# Patient Record
Sex: MALE | Race: WHITE | NOT HISPANIC OR LATINO | URBAN - METROPOLITAN AREA
[De-identification: names, ages, dates, MRNs, and addresses within clinical notes are randomized per-mention and may not be internally consistent; named-entity substitution may affect disease eponyms.]

---

## 2019-06-03 ENCOUNTER — OUTPATIENT (OUTPATIENT)
Dept: OUTPATIENT SERVICES | Facility: HOSPITAL | Age: 64
LOS: 1 days | Discharge: HOME | End: 2019-06-03
Payer: OTHER MISCELLANEOUS

## 2019-06-03 VITALS
HEART RATE: 84 BPM | DIASTOLIC BLOOD PRESSURE: 72 MMHG | TEMPERATURE: 98 F | HEIGHT: 71 IN | RESPIRATION RATE: 20 BRPM | SYSTOLIC BLOOD PRESSURE: 126 MMHG | WEIGHT: 257.94 LBS | OXYGEN SATURATION: 98 %

## 2019-06-03 DIAGNOSIS — Z29.9 ENCOUNTER FOR PROPHYLACTIC MEASURES, UNSPECIFIED: ICD-10-CM

## 2019-06-03 DIAGNOSIS — Z98.890 OTHER SPECIFIED POSTPROCEDURAL STATES: Chronic | ICD-10-CM

## 2019-06-03 DIAGNOSIS — Z90.49 ACQUIRED ABSENCE OF OTHER SPECIFIED PARTS OF DIGESTIVE TRACT: Chronic | ICD-10-CM

## 2019-06-03 DIAGNOSIS — Z96.642 PRESENCE OF LEFT ARTIFICIAL HIP JOINT: Chronic | ICD-10-CM

## 2019-06-03 DIAGNOSIS — Z96.641 PRESENCE OF RIGHT ARTIFICIAL HIP JOINT: Chronic | ICD-10-CM

## 2019-06-03 DIAGNOSIS — M54.5 LOW BACK PAIN: ICD-10-CM

## 2019-06-03 LAB
ANION GAP SERPL CALC-SCNC: 4 MMOL/L — LOW (ref 5–17)
APPEARANCE UR: CLEAR — SIGNIFICANT CHANGE UP
APTT BLD: 30.6 SEC — SIGNIFICANT CHANGE UP (ref 27.5–36.3)
BASOPHILS # BLD AUTO: 0.05 K/UL — SIGNIFICANT CHANGE UP (ref 0–0.2)
BASOPHILS NFR BLD AUTO: 0.5 % — SIGNIFICANT CHANGE UP (ref 0–2)
BILIRUB UR-MCNC: NEGATIVE — SIGNIFICANT CHANGE UP
BLD GP AB SCN SERPL QL: SIGNIFICANT CHANGE UP
BUN SERPL-MCNC: 18 MG/DL — SIGNIFICANT CHANGE UP (ref 7–23)
CALCIUM SERPL-MCNC: 9.4 MG/DL — SIGNIFICANT CHANGE UP (ref 8.5–10.1)
CHLORIDE SERPL-SCNC: 103 MMOL/L — SIGNIFICANT CHANGE UP (ref 96–108)
CO2 SERPL-SCNC: 28 MMOL/L — SIGNIFICANT CHANGE UP (ref 22–31)
COLOR SPEC: YELLOW — SIGNIFICANT CHANGE UP
CREAT SERPL-MCNC: 0.93 MG/DL — SIGNIFICANT CHANGE UP (ref 0.5–1.3)
DIFF PNL FLD: NEGATIVE — SIGNIFICANT CHANGE UP
EOSINOPHIL # BLD AUTO: 0.25 K/UL — SIGNIFICANT CHANGE UP (ref 0–0.5)
EOSINOPHIL NFR BLD AUTO: 2.6 % — SIGNIFICANT CHANGE UP (ref 0–6)
GLUCOSE SERPL-MCNC: 97 MG/DL — SIGNIFICANT CHANGE UP (ref 70–99)
GLUCOSE UR QL: 1000 MG/DL
HBA1C BLD-MCNC: 6.2 % — HIGH (ref 4–5.6)
HCT VFR BLD CALC: 44.1 % — SIGNIFICANT CHANGE UP (ref 39–50)
HGB BLD-MCNC: 14.1 G/DL — SIGNIFICANT CHANGE UP (ref 13–17)
IMM GRANULOCYTES NFR BLD AUTO: 0.5 % — SIGNIFICANT CHANGE UP (ref 0–1.5)
INR BLD: 1.04 RATIO — SIGNIFICANT CHANGE UP (ref 0.88–1.16)
KETONES UR-MCNC: ABNORMAL
LEUKOCYTE ESTERASE UR-ACNC: NEGATIVE — SIGNIFICANT CHANGE UP
LYMPHOCYTES # BLD AUTO: 2.36 K/UL — SIGNIFICANT CHANGE UP (ref 1–3.3)
LYMPHOCYTES # BLD AUTO: 24.9 % — SIGNIFICANT CHANGE UP (ref 13–44)
MCHC RBC-ENTMCNC: 28 PG — SIGNIFICANT CHANGE UP (ref 27–34)
MCHC RBC-ENTMCNC: 32 GM/DL — SIGNIFICANT CHANGE UP (ref 32–36)
MCV RBC AUTO: 87.5 FL — SIGNIFICANT CHANGE UP (ref 80–100)
MONOCYTES # BLD AUTO: 0.77 K/UL — SIGNIFICANT CHANGE UP (ref 0–0.9)
MONOCYTES NFR BLD AUTO: 8.1 % — SIGNIFICANT CHANGE UP (ref 2–14)
MRSA PCR RESULT.: SIGNIFICANT CHANGE UP
NEUTROPHILS # BLD AUTO: 6 K/UL — SIGNIFICANT CHANGE UP (ref 1.8–7.4)
NEUTROPHILS NFR BLD AUTO: 63.4 % — SIGNIFICANT CHANGE UP (ref 43–77)
NITRITE UR-MCNC: NEGATIVE — SIGNIFICANT CHANGE UP
PH UR: 5 — SIGNIFICANT CHANGE UP (ref 5–8)
PLATELET # BLD AUTO: 247 K/UL — SIGNIFICANT CHANGE UP (ref 150–400)
POTASSIUM SERPL-MCNC: 4.4 MMOL/L — SIGNIFICANT CHANGE UP (ref 3.5–5.3)
POTASSIUM SERPL-SCNC: 4.4 MMOL/L — SIGNIFICANT CHANGE UP (ref 3.5–5.3)
PROT UR-MCNC: NEGATIVE MG/DL — SIGNIFICANT CHANGE UP
PROTHROM AB SERPL-ACNC: 11.6 SEC — SIGNIFICANT CHANGE UP (ref 10–12.9)
RBC # BLD: 5.04 M/UL — SIGNIFICANT CHANGE UP (ref 4.2–5.8)
RBC # FLD: 14.3 % — SIGNIFICANT CHANGE UP (ref 10.3–14.5)
S AUREUS DNA NOSE QL NAA+PROBE: SIGNIFICANT CHANGE UP
SODIUM SERPL-SCNC: 135 MMOL/L — SIGNIFICANT CHANGE UP (ref 135–145)
SP GR SPEC: 1.02 — SIGNIFICANT CHANGE UP (ref 1.01–1.02)
TYPE + AB SCN PNL BLD: SIGNIFICANT CHANGE UP
UROBILINOGEN FLD QL: NEGATIVE MG/DL — SIGNIFICANT CHANGE UP
WBC # BLD: 9.48 K/UL — SIGNIFICANT CHANGE UP (ref 3.8–10.5)
WBC # FLD AUTO: 9.48 K/UL — SIGNIFICANT CHANGE UP (ref 3.8–10.5)

## 2019-06-03 PROCEDURE — 71046 X-RAY EXAM CHEST 2 VIEWS: CPT | Mod: 26

## 2019-06-03 PROCEDURE — 93010 ELECTROCARDIOGRAM REPORT: CPT

## 2019-06-03 RX ORDER — METOPROLOL TARTRATE 50 MG
1 TABLET ORAL
Qty: 0 | Refills: 0 | DISCHARGE

## 2019-06-03 NOTE — H&P PST ADULT - ATTENDING COMMENTS
patient with extensive conservative treatment with severe back pain and lumbar instability at L3-4 and with marked degenerative disc disease at L4-5 and L5-S1.  Therefore decision to proceed with lumbar decompression and fusion at L3-L4 level and fusion at L4-5 and L5-S1.  All questions answered.

## 2019-06-03 NOTE — H&P PST ADULT - ASSESSMENT
63 y/o with PMH of atrial flutter, back pain, DM, BPH. Complain of low back pain that radiates to both legs with numbness. Scheduled for L3-L4 lumbar decompression and fusion.  Plan  1. Stop all NSAIDS, herbal supplements and vitamins for 7 days.  2. NPO at midnight.  3. Use EZ sponges as directed  4. Use mupirocin as directed  5. Labs, EKG, CXR as per surgeon  6. PMD/cardiologist visit for optimization prior to surgery as per surgeon.  CAPRINI SCORE [CLOT]    AGE RELATED RISK FACTORS                                                       MOBILITY RELATED FACTORS  [ ] Age 41-60 years                                            (1 Point)                  [ ] Bed rest                                                        (1 Point)  [ x ] Age: 61-74 years                                           (2 Points)                 [ ] Plaster cast                                                   (2 Points)  [ ] Age= 75 years                                              (3 Points)                 [ ] Bed bound for more than 72 hours                 (2 Points)    DISEASE RELATED RISK FACTORS                                               GENDER SPECIFIC FACTORS  [ ] Edema in the lower extremities                       (1 Point)                  [ ] Pregnancy                                                     (1 Point)  [ ] Varicose veins                                               (1 Point)                  [ ] Post-partum < 6 weeks                                   (1 Point)             [x ] BMI > 25 Kg/m2                                            (1 Point)                  [ ] Hormonal therapy  or oral contraception          (1 Point)                 [ ] Sepsis (in the previous month)                        (1 Point)                  [ ] History of pregnancy complications                 (1 point)  [ ] Pneumonia or serious lung disease                                               [ ] Unexplained or recurrent                     (1 Point)           (in the previous month)                               (1 Point)  [ ] Abnormal pulmonary function test                     (1 Point)                 SURGERY RELATED RISK FACTORS  [ ] Acute myocardial infarction                              (1 Point)                 [ ]  Section                                             (1 Point)  [ ] Congestive heart failure (in the previous month)  (1 Point)               [ ] Minor surgery                                                  (1 Point)   [ ] Inflammatory bowel disease                             (1 Point)                 [ ] Arthroscopic surgery                                        (2 Points)  [ ] Central venous access                                      (2 Points)                [ x ] General surgery lasting more than 45 minutes   (2 Points)       [ ] Stroke (in the previous month)                          (5 Points)               [ ] Elective arthroplasty                                         (5 Points)     ()  malignancy                                                             (2 points )                                                                                                                                      HEMATOLOGY RELATED FACTORS                                                 TRAUMA RELATED RISK FACTORS  [ ] Prior episodes of VTE                                     (3 Points)                 [ ] Fracture of the hip, pelvis, or leg                       (5 Points)  [ ] Positive family history for VTE                         (3 Points)                 [ ] Acute spinal cord injury (in the previous month)  (5 Points)  [ ] Prothrombin 16740 A                                     (3 Points)                 [ ] Paralysis  (less than 1 month)                             (5 Points)  [ ] Factor V Leiden                                             (3 Points)                  [ ] Multiple Trauma within 1 month                        (5 Points)  [ ] Lupus anticoagulants                                     (3 Points)                                                           [ ] Anticardiolipin antibodies                               (3 Points)                                                       [ ] High homocysteine in the blood                      (3 Points)                                             [ ] Other congenital or acquired thrombophilia      (3 Points)                                                [ ] Heparin induced thrombocytopenia                  (3 Points)    (  ) Malignancy                                        Total Score [    5      ] 63 y/o with PMH of lumbar discs disorder, atrial flutter, back pain, DM, BPH. Complain of low back pain that radiates to both legs with numbness. Scheduled for L3-L4 lumbar decompression and fusion.    Plan  1. Stop all NSAIDS, herbal supplements and vitamins for 7 days.  2. NPO at midnight.  3. Use EZ sponges as directed  4. Use mupirocin as directed  5. Labs, EKG, CXR as per surgeon  6. PMD/cardiologist visit for optimization prior to surgery as per surgeon.    CAPRINI SCORE [CLOT]  AGE RELATED RISK FACTORS                                                       MOBILITY RELATED FACTORS  [ ] Age 41-60 years                                            (1 Point)                  [ ] Bed rest                                                        (1 Point)  [ x ] Age: 61-74 years                                           (2 Points)                 [ ] Plaster cast                                                   (2 Points)  [ ] Age= 75 years                                              (3 Points)                 [ ] Bed bound for more than 72 hours                 (2 Points)    DISEASE RELATED RISK FACTORS                                               GENDER SPECIFIC FACTORS  [ ] Edema in the lower extremities                       (1 Point)                  [ ] Pregnancy                                                     (1 Point)  [ ] Varicose veins                                               (1 Point)                  [ ] Post-partum < 6 weeks                                   (1 Point)             [x ] BMI > 25 Kg/m2                                            (1 Point)                  [ ] Hormonal therapy  or oral contraception          (1 Point)                 [ ] Sepsis (in the previous month)                        (1 Point)                  [ ] History of pregnancy complications                 (1 point)  [ ] Pneumonia or serious lung disease                                               [ ] Unexplained or recurrent                     (1 Point)           (in the previous month)                               (1 Point)  [ ] Abnormal pulmonary function test                     (1 Point)                 SURGERY RELATED RISK FACTORS  [ ] Acute myocardial infarction                              (1 Point)                 [ ]  Section                                             (1 Point)  [ ] Congestive heart failure (in the previous month)  (1 Point)               [ ] Minor surgery                                                  (1 Point)   [ ] Inflammatory bowel disease                             (1 Point)                 [ ] Arthroscopic surgery                                        (2 Points)  [ ] Central venous access                                      (2 Points)                [ x ] General surgery lasting more than 45 minutes   (2 Points)       [ ] Stroke (in the previous month)                          (5 Points)               [ ] Elective arthroplasty                                         (5 Points)     ()  malignancy                                                             (2 points )                                                                                                                                      HEMATOLOGY RELATED FACTORS                                                 TRAUMA RELATED RISK FACTORS  [ ] Prior episodes of VTE                                     (3 Points)                 [ ] Fracture of the hip, pelvis, or leg                       (5 Points)  [ ] Positive family history for VTE                         (3 Points)                 [ ] Acute spinal cord injury (in the previous month)  (5 Points)  [ ] Prothrombin 99182 A                                     (3 Points)                 [ ] Paralysis  (less than 1 month)                             (5 Points)  [ ] Factor V Leiden                                             (3 Points)                  [ ] Multiple Trauma within 1 month                        (5 Points)  [ ] Lupus anticoagulants                                     (3 Points)                                                           [ ] Anticardiolipin antibodies                               (3 Points)                                                       [ ] High homocysteine in the blood                      (3 Points)                                             [ ] Other congenital or acquired thrombophilia      (3 Points)                                                [ ] Heparin induced thrombocytopenia                  (3 Points)    (  ) Malignancy                                        Total Score [    5      ]

## 2019-06-03 NOTE — H&P PST ADULT - HISTORY OF PRESENT ILLNESS
65 y/o male with Scheduled for L3-L4 lumbar decompression and fusion. 65 y/o with PMH of atrial flutter, back pain, DM, BPH. Complain of low back pain that radiates to both legs with numbness. Pt takes oxycodone, gabapentin with mild pain relief. Scheduled for L3-L4 lumbar decompression and fusion. 65 y/o with PMH of lumbar discs disorder, atrial flutter, back pain, DM, BPH. Complain of low back pain that radiates to both legs with numbness. Pt takes oxycodone, gabapentin with mild pain relief. Scheduled for L3-L4 lumbar decompression and fusion. 65 y/o with PMH of lumbar discs disorder, atrial flutter, back pain, DM, BPH. Complain of low back pain that radiates to both legs more to the right leg, with numbness. Pt takes oxycodone, gabapentin with mild pain relief. Scheduled for L3-L4 lumbar decompression and fusion.

## 2019-06-03 NOTE — H&P PST ADULT - NSICDXPASTMEDICALHX_GEN_ALL_CORE_FT
PAST MEDICAL HISTORY:  Asthma h/o    Atrial flutter S/P ablation    BPH (benign prostatic hyperplasia)     Diverticulitis     DM (diabetes mellitus)     Headache     OA (osteoarthritis)     Obesity     Sleep apnea CPAP PAST MEDICAL HISTORY:  Asthma h/o    Atrial flutter S/P ablation    BPH (benign prostatic hyperplasia)     Disc disorder of lumbar region     Diverticulitis     DM (diabetes mellitus)     Headache     OA (osteoarthritis)     Obesity     Sleep apnea CPAP

## 2019-06-03 NOTE — H&P PST ADULT - NSICDXFAMILYHX_GEN_ALL_CORE_FT
FAMILY HISTORY:  Family history of heart disease, father - MI  Family history of lung disease, mother - lung mass

## 2019-06-03 NOTE — H&P PST ADULT - NSICDXPASTSURGICALHX_GEN_ALL_CORE_FT
PAST SURGICAL HISTORY:  H/O prior ablation treatment cardiac ablation    History of right hip replacement     S/P cholecystectomy     S/P colon resection     Status post left hip replacement

## 2019-06-11 RX ORDER — NALOXONE HYDROCHLORIDE 4 MG/.1ML
0.1 SPRAY NASAL
Refills: 0 | Status: DISCONTINUED | OUTPATIENT
Start: 2019-06-12 | End: 2019-06-17

## 2019-06-11 RX ORDER — HYDROMORPHONE HYDROCHLORIDE 2 MG/ML
30 INJECTION INTRAMUSCULAR; INTRAVENOUS; SUBCUTANEOUS
Refills: 0 | Status: DISCONTINUED | OUTPATIENT
Start: 2019-06-12 | End: 2019-06-14

## 2019-06-12 ENCOUNTER — TRANSCRIPTION ENCOUNTER (OUTPATIENT)
Age: 64
End: 2019-06-12

## 2019-06-12 ENCOUNTER — INPATIENT (INPATIENT)
Facility: HOSPITAL | Age: 64
LOS: 4 days | Discharge: HOME | End: 2019-06-17
Attending: ORTHOPAEDIC SURGERY | Admitting: ORTHOPAEDIC SURGERY
Payer: OTHER MISCELLANEOUS

## 2019-06-12 VITALS
OXYGEN SATURATION: 98 % | RESPIRATION RATE: 16 BRPM | TEMPERATURE: 98 F | HEART RATE: 90 BPM | WEIGHT: 255.07 LBS | SYSTOLIC BLOOD PRESSURE: 119 MMHG | DIASTOLIC BLOOD PRESSURE: 87 MMHG | HEIGHT: 71 IN

## 2019-06-12 DIAGNOSIS — Z98.890 OTHER SPECIFIED POSTPROCEDURAL STATES: Chronic | ICD-10-CM

## 2019-06-12 DIAGNOSIS — Z96.642 PRESENCE OF LEFT ARTIFICIAL HIP JOINT: Chronic | ICD-10-CM

## 2019-06-12 DIAGNOSIS — Z96.641 PRESENCE OF RIGHT ARTIFICIAL HIP JOINT: Chronic | ICD-10-CM

## 2019-06-12 DIAGNOSIS — M54.16 RADICULOPATHY, LUMBAR REGION: ICD-10-CM

## 2019-06-12 DIAGNOSIS — Z90.49 ACQUIRED ABSENCE OF OTHER SPECIFIED PARTS OF DIGESTIVE TRACT: Chronic | ICD-10-CM

## 2019-06-12 LAB
ANION GAP SERPL CALC-SCNC: 6 MMOL/L — SIGNIFICANT CHANGE UP (ref 5–17)
BASOPHILS # BLD AUTO: 0.02 K/UL — SIGNIFICANT CHANGE UP (ref 0–0.2)
BASOPHILS NFR BLD AUTO: 0.1 % — SIGNIFICANT CHANGE UP (ref 0–2)
BUN SERPL-MCNC: 20 MG/DL — SIGNIFICANT CHANGE UP (ref 7–23)
CALCIUM SERPL-MCNC: 8.4 MG/DL — LOW (ref 8.5–10.1)
CHLORIDE SERPL-SCNC: 108 MMOL/L — SIGNIFICANT CHANGE UP (ref 96–108)
CO2 SERPL-SCNC: 26 MMOL/L — SIGNIFICANT CHANGE UP (ref 22–31)
CREAT SERPL-MCNC: 1.2 MG/DL — SIGNIFICANT CHANGE UP (ref 0.5–1.3)
EOSINOPHIL # BLD AUTO: 0.01 K/UL — SIGNIFICANT CHANGE UP (ref 0–0.5)
EOSINOPHIL NFR BLD AUTO: 0.1 % — SIGNIFICANT CHANGE UP (ref 0–6)
GLUCOSE BLDC GLUCOMTR-MCNC: 139 MG/DL — HIGH (ref 70–99)
GLUCOSE BLDC GLUCOMTR-MCNC: 154 MG/DL — HIGH (ref 70–99)
GLUCOSE SERPL-MCNC: 151 MG/DL — HIGH (ref 70–99)
HCT VFR BLD CALC: 39.5 % — SIGNIFICANT CHANGE UP (ref 39–50)
HGB BLD-MCNC: 12.7 G/DL — LOW (ref 13–17)
IMM GRANULOCYTES NFR BLD AUTO: 0.9 % — SIGNIFICANT CHANGE UP (ref 0–1.5)
LYMPHOCYTES # BLD AUTO: 0.67 K/UL — LOW (ref 1–3.3)
LYMPHOCYTES # BLD AUTO: 4.2 % — LOW (ref 13–44)
MCHC RBC-ENTMCNC: 28.5 PG — SIGNIFICANT CHANGE UP (ref 27–34)
MCHC RBC-ENTMCNC: 32.2 GM/DL — SIGNIFICANT CHANGE UP (ref 32–36)
MCV RBC AUTO: 88.8 FL — SIGNIFICANT CHANGE UP (ref 80–100)
MONOCYTES # BLD AUTO: 0.53 K/UL — SIGNIFICANT CHANGE UP (ref 0–0.9)
MONOCYTES NFR BLD AUTO: 3.3 % — SIGNIFICANT CHANGE UP (ref 2–14)
NEUTROPHILS # BLD AUTO: 14.69 K/UL — HIGH (ref 1.8–7.4)
NEUTROPHILS NFR BLD AUTO: 91.4 % — HIGH (ref 43–77)
PLATELET # BLD AUTO: 216 K/UL — SIGNIFICANT CHANGE UP (ref 150–400)
POTASSIUM SERPL-MCNC: 4.5 MMOL/L — SIGNIFICANT CHANGE UP (ref 3.5–5.3)
POTASSIUM SERPL-SCNC: 4.5 MMOL/L — SIGNIFICANT CHANGE UP (ref 3.5–5.3)
RBC # BLD: 4.45 M/UL — SIGNIFICANT CHANGE UP (ref 4.2–5.8)
RBC # FLD: 14.3 % — SIGNIFICANT CHANGE UP (ref 10.3–14.5)
SODIUM SERPL-SCNC: 140 MMOL/L — SIGNIFICANT CHANGE UP (ref 135–145)
WBC # BLD: 16.06 K/UL — HIGH (ref 3.8–10.5)
WBC # FLD AUTO: 16.06 K/UL — HIGH (ref 3.8–10.5)

## 2019-06-12 PROCEDURE — 22614 ARTHRD PST TQ 1NTRSPC EA ADD: CPT | Mod: AS

## 2019-06-12 PROCEDURE — 22633 ARTHRD CMBN 1NTRSPC LUMBAR: CPT | Mod: AS

## 2019-06-12 PROCEDURE — 63048 LAM FACETEC &FORAMOT EA ADDL: CPT | Mod: AS

## 2019-06-12 PROCEDURE — 63047 LAM FACETEC & FORAMOT LUMBAR: CPT | Mod: AS,59

## 2019-06-12 PROCEDURE — 22842 INSERT SPINE FIXATION DEVICE: CPT | Mod: AS

## 2019-06-12 RX ORDER — DOCUSATE SODIUM 100 MG
100 CAPSULE ORAL THREE TIMES A DAY
Refills: 0 | Status: DISCONTINUED | OUTPATIENT
Start: 2019-06-12 | End: 2019-06-17

## 2019-06-12 RX ORDER — METHOCARBAMOL 500 MG/1
750 TABLET, FILM COATED ORAL
Refills: 0 | Status: DISCONTINUED | OUTPATIENT
Start: 2019-06-12 | End: 2019-06-17

## 2019-06-12 RX ORDER — CHOLECALCIFEROL (VITAMIN D3) 125 MCG
1000 CAPSULE ORAL DAILY
Refills: 0 | Status: DISCONTINUED | OUTPATIENT
Start: 2019-06-12 | End: 2019-06-17

## 2019-06-12 RX ORDER — INSULIN LISPRO 100/ML
VIAL (ML) SUBCUTANEOUS
Refills: 0 | Status: DISCONTINUED | OUTPATIENT
Start: 2019-06-12 | End: 2019-06-17

## 2019-06-12 RX ORDER — SODIUM CHLORIDE 9 MG/ML
1000 INJECTION, SOLUTION INTRAVENOUS
Refills: 0 | Status: DISCONTINUED | OUTPATIENT
Start: 2019-06-12 | End: 2019-06-17

## 2019-06-12 RX ORDER — DEXTROSE 50 % IN WATER 50 %
12.5 SYRINGE (ML) INTRAVENOUS ONCE
Refills: 0 | Status: DISCONTINUED | OUTPATIENT
Start: 2019-06-12 | End: 2019-06-17

## 2019-06-12 RX ORDER — DEXTROSE 50 % IN WATER 50 %
15 SYRINGE (ML) INTRAVENOUS ONCE
Refills: 0 | Status: DISCONTINUED | OUTPATIENT
Start: 2019-06-12 | End: 2019-06-17

## 2019-06-12 RX ORDER — ONDANSETRON 8 MG/1
4 TABLET, FILM COATED ORAL EVERY 6 HOURS
Refills: 0 | Status: DISCONTINUED | OUTPATIENT
Start: 2019-06-12 | End: 2019-06-17

## 2019-06-12 RX ORDER — SENNA PLUS 8.6 MG/1
2 TABLET ORAL AT BEDTIME
Refills: 0 | Status: DISCONTINUED | OUTPATIENT
Start: 2019-06-12 | End: 2019-06-17

## 2019-06-12 RX ORDER — ACETAMINOPHEN 500 MG
975 TABLET ORAL EVERY 6 HOURS
Refills: 0 | Status: DISCONTINUED | OUTPATIENT
Start: 2019-06-12 | End: 2019-06-17

## 2019-06-12 RX ORDER — PROCHLORPERAZINE MALEATE 5 MG
10 TABLET ORAL EVERY 8 HOURS
Refills: 0 | Status: DISCONTINUED | OUTPATIENT
Start: 2019-06-12 | End: 2019-06-17

## 2019-06-12 RX ORDER — GABAPENTIN 400 MG/1
400 CAPSULE ORAL THREE TIMES A DAY
Refills: 0 | Status: DISCONTINUED | OUTPATIENT
Start: 2019-06-12 | End: 2019-06-17

## 2019-06-12 RX ORDER — DEXTROSE 50 % IN WATER 50 %
25 SYRINGE (ML) INTRAVENOUS ONCE
Refills: 0 | Status: DISCONTINUED | OUTPATIENT
Start: 2019-06-12 | End: 2019-06-17

## 2019-06-12 RX ORDER — CEFAZOLIN SODIUM 1 G
2000 VIAL (EA) INJECTION EVERY 8 HOURS
Refills: 0 | Status: COMPLETED | OUTPATIENT
Start: 2019-06-12 | End: 2019-06-13

## 2019-06-12 RX ORDER — SODIUM CHLORIDE 9 MG/ML
1000 INJECTION, SOLUTION INTRAVENOUS
Refills: 0 | Status: DISCONTINUED | OUTPATIENT
Start: 2019-06-12 | End: 2019-06-12

## 2019-06-12 RX ORDER — HYDROMORPHONE HYDROCHLORIDE 2 MG/ML
1 INJECTION INTRAMUSCULAR; INTRAVENOUS; SUBCUTANEOUS EVERY 6 HOURS
Refills: 0 | Status: DISCONTINUED | OUTPATIENT
Start: 2019-06-12 | End: 2019-06-13

## 2019-06-12 RX ORDER — ONDANSETRON 8 MG/1
4 TABLET, FILM COATED ORAL EVERY 6 HOURS
Refills: 0 | Status: DISCONTINUED | OUTPATIENT
Start: 2019-06-12 | End: 2019-06-13

## 2019-06-12 RX ORDER — OXYCODONE HYDROCHLORIDE 5 MG/1
10 TABLET ORAL EVERY 4 HOURS
Refills: 0 | Status: DISCONTINUED | OUTPATIENT
Start: 2019-06-12 | End: 2019-06-13

## 2019-06-12 RX ORDER — GLUCAGON INJECTION, SOLUTION 0.5 MG/.1ML
1 INJECTION, SOLUTION SUBCUTANEOUS ONCE
Refills: 0 | Status: DISCONTINUED | OUTPATIENT
Start: 2019-06-12 | End: 2019-06-17

## 2019-06-12 RX ADMIN — GABAPENTIN 400 MILLIGRAM(S): 400 CAPSULE ORAL at 22:47

## 2019-06-12 RX ADMIN — HYDROMORPHONE HYDROCHLORIDE 30 MILLILITER(S): 2 INJECTION INTRAMUSCULAR; INTRAVENOUS; SUBCUTANEOUS at 16:05

## 2019-06-12 RX ADMIN — SODIUM CHLORIDE 75 MILLILITER(S): 9 INJECTION, SOLUTION INTRAVENOUS at 19:31

## 2019-06-12 RX ADMIN — Medication 100 MILLIGRAM(S): at 23:07

## 2019-06-12 RX ADMIN — METHOCARBAMOL 750 MILLIGRAM(S): 500 TABLET, FILM COATED ORAL at 23:55

## 2019-06-12 RX ADMIN — SODIUM CHLORIDE 100 MILLILITER(S): 9 INJECTION, SOLUTION INTRAVENOUS at 21:24

## 2019-06-12 NOTE — DISCHARGE NOTE PROVIDER - PROVIDER TOKENS
PROVIDER:[TOKEN:[[id number]]] PROVIDER:[TOKEN:[[id number]]],PROVIDER:[TOKEN:[[id number]]]

## 2019-06-12 NOTE — DISCHARGE NOTE PROVIDER - NSDCFUADDINST_GEN_ALL_CORE_FT
1.	Pain Control  2.	Walking with full weight bearing as tolerated, with assistive devices (walker/Cane as Needed), please wear LSO brace at all times while ambulating  3.	PT as needed  4.	Follow up with Dr. Horan  as outpatient in 7-10 days after discharge from the hospital or rehab. Call office for appointment.  5.	Keep dressing clean and dry. Remove on Post Op Day three.  6.	No baths/hot tubs or soaks.

## 2019-06-12 NOTE — PROGRESS NOTE ADULT - SUBJECTIVE AND OBJECTIVE BOX
[facility]  Operative Report  Date of Surgery: 06/12/19  Patient: Gary Saavedra  Surgeon: Kelsie Horan DO – Orthopedic Surgery Attending  Assistant: Orthopedic Resident Dr. Ortiz  Dictation:   Preop Diagnosis:  Lumbar Stenosis L3/L4, severe interactable back pain, Degenerative disc disease, lumbar spondylolisthesis L3/4,  Lumbar severe facet arthropathy L4/5 and L5/S1, lumbar radiculopathy, severe back pain, difficulty with ambulating, patient had multiple rounds of epidural injections.  Symptoms ongoing for > 1 years.      Post op Diagnosis: As above, Intraoperative finding of L3-L4 unilateral pars injury on right side correlated with lumbar spondylolisthesis.    Procedure Summary:   L5/S1, L4/L5 Partial Facetectomy,   Hemilaminotomy and foraminotomy L3 on right side,   Hemilaminotomy and foraminotomy L3 on left side  Superior laminotomy of L4 on right side  Superior laminotomy of L4 on left side   Segmental Instrumentation L3/4/5/S1 with Pedicle Screw Insertion and Interconnecting bilateral rods  Arthrodesis posterolateral L3/L4/L5/S1  Discectomy L3/L4  Interbody biomechanical cage L3/L4  Interbody fusion L3/L4  Lisha Osteotomy at L3/L4 to reduce spondylolisthesis  Local autograft Harvestation  Flouroscopy  Allograft, BMP  Anesthesia: General Endotracheal Intubation  Positioning: Prone on Perry Table with Moiz frame  Complication None:    Procedure in Detail:  Preoperative Consent was obtained.  Patient has severe interactable back pain and leg pain. Patient has significant bilateral leg pain.  He reports overall pain score is 8/10 – 10/10 on most days.  Symptoms ongoing for greater than 1 year.   Due to severe back pain, lumbar fusion was added to his surgical treatment for lumbar spondylolisthesis and severe degenerative facet and disc disease. Symptoms have not improved with multiple treatments and a long non operative course.  Extensive discussion regarding patient’s diagnosis, imaging, operative and non-operative options discussed with patient over multiple visits.  All questions are answered.  Informed consent in obtained.  No guarantees implied.  Goals are improvement in pain and quality of life.    Patient received general anesthesia.  Neuromonitoring devices were placed by neuromonitoring service.  Velez catheter was inserted. Patient was placed prone on the operative table.  All bony prominences were padded.  Neuromonitoring signals were confirmed.  Patient received preoperative antibiotics.      Patient’s lumbar spine was prepped and draped in sterile manner.  Flouroscopy was used to confirm the location of the pedicle. Midline incision were made and lamina of L3, L4, L5 and S1 exposed.  Interspaced confirmed with xray.  Subsequently, facet joints and TP was exposed from L3-4-5-S1.  At this point, while exposing the L3 pedicle, a unilateral pars fracture is noted at the right side explaining the spondylolisthesis at this level.      Each pedicle was then tapped and stimulated.  All screws were above the threshold of 10. Pedicle stimulation was more than 10 at all levels.  Subsequently 6.5*45 and 6.5*50mm Nuvasive pedicle screws were instrumented due to excellent bone quality otherwise.  TP was dissected laterally at both L3, L4 and L5 and S1 and sacral ala.  At this point wound was irrigated.  Midline interspinous ligament was preserved to prevent any additional instability of spine and part of minimum invasive techniques.     Laminectomy of L4 and superior hemilaminectomy of L4 to adequately decompress the exiting nerves on the left side.  Next dura was mobilized over the disc-space. broad based bulging noted bilatearl.  Bipolar was used to maintain coagulation.  Annulotomy was performed and then disc space was prepped for fusion with yohannes, ring currettes.  No pressure on the nerve roots noted.   Complete facetectomies performed bilateral L3/L4 to help reduce spondylolisthesis.  [place] was used to confirm adequate foraminal space as well.  Size 10 expandable cage provided excellent purchase.  Next bone harvested from lamina and bone graft is inserted into the disc space and interbody cage inserted and expanded followed by xray imaging confirming adequate cage placement.    Next attention is paid to decompression on right side as well.  Laminectomy of L4 and superior hemilaminectomy of L4 to adequately decompress the exiting nerves on the left side.  Next dura was mobilized over the disc-space. broad based bulging noted bilatearl.  Bipolar was used to maintain coagulation.  Annulotomy was performed and then disc space was prepped for fusion with yohannes, ring currettes.  No pressure on the nerve roots noted after decompression.    Next attention was paid to Lisha osteotomy for spondylolisthesis, complete facetectomy on left side and partial of right side with anterior interbody fusion and use of nohemi reduction, distraction, compression techniques to reduce spondylolisthesis to essentially anatomic position.    Subsequently, BMP, autograft from the facets and partial laminotomy and decortication and allograft using corticocancellous chips were placed in the gutters.  Subsequently, nohemi was inserted and set screws placed.  All set screws were final tightened.  Final xrays were taken and I was satisfied with overall positioning of hardware and instrumentation.     Next, each incision was closed with number 1 vicryl for fascia, 0 vicryl for subcutaneous tissue and 2.0 vicryl.  Drain was inserted over the epidural space.   2gm of vancomycin powder was also placed in deep and superficial fascia.    Skin adhesive was applied.  Dry sterile dressing was applied.  Patient was transferred to supine position on regular bed.  Patient was extubated.  Patient had tolerated the procedure well.  Patient was moving b/l lower extremity    I personally spoke with patient's family at the end of the case.    Kelsie Horan DO  Orthopedic Spine Surgeon

## 2019-06-12 NOTE — PROGRESS NOTE ADULT - SUBJECTIVE AND OBJECTIVE BOX
Pt seen & examined in PACU, Pain controlled. Tolerated procedure well, denies any numbness/tingling.  Denies any CP/SOB.  Informed by nursing that pt has severe sleep apnea and will be admitted to Tele floor with continuous monitoring.  D/w patient importance of wearing CPAP, pt understands.     Vital Signs Last 24 Hrs  T(C): 37 (12 Jun 2019 15:41), Max: 37 (12 Jun 2019 15:41)  T(F): 98.6 (12 Jun 2019 15:41), Max: 98.6 (12 Jun 2019 15:41)  HR: 97 (12 Jun 2019 16:00) (83 - 97)  BP: 149/75 (12 Jun 2019 16:00) (119/87 - 182/89)  BP(mean): --  RR: 18 (12 Jun 2019 16:00) (16 - 24)  SpO2: 96% (12 Jun 2019 16:00) (92% - 98%)    Gen: NAD  Spine:  Dressing clean D&I  ANGELA drain and HMV drain in place and functioning  +Sensation C5-T1 & L2-S1  Moving all extremities grossly   Distal pulses intact  Soft compartments, - calf tenderness to palpation

## 2019-06-12 NOTE — DISCHARGE NOTE PROVIDER - NSDCCPTREATMENT_GEN_ALL_CORE_FT
PRINCIPAL PROCEDURE  Procedure: Lumbar spinal fusion  Findings and Treatment: 1.	Pain Control  2.	Walking with full weight bearing as tolerated, with assistive devices (walker/Cane as Needed), please wear LSO brace at all times while ambulating, do no have to wear while in chair.   3.	PT as needed  4.	Follow up with Dr. Horan as outpatient in 7-10 days after discharge from the hospital or rehab. Call office for appointment.  5.	Keep dressing clean and dry. Remove on Post Op Day three.  6.	No baths/hot tubs or soaks.

## 2019-06-12 NOTE — DISCHARGE NOTE PROVIDER - CARE PROVIDER_API CALL
Kelsie Horan (DO)  Orthopaedic Surgery Orthopaedics Surgery  [address] [facility] [facility], [place] [address]  Phone: [phone]  Fax: [phone]  Follow Up Time: Kelsie Horan (DO)  Orthopaedic Surgery Orthopaedics Surgery  [address] [facility], [address]  [place] [address]  Phone: [phone]  Fax: [phone]  Follow Up Time:     Jason Awan)  Urology  [address] [facility], [address] [place] [address]  Phone: [phone]  Fax: [phone]  Follow Up Time:

## 2019-06-12 NOTE — DISCHARGE NOTE PROVIDER - HOSPITAL COURSE
The patient is a 64 year old Male status post PSF of  L3-S1 after failing conservative management h for intractable Low Back Pain with Radiculopathy. The Patient was medically Optimized for the Previously mentioned surgical procedure. The patient was taken to the operating room on date mentioned above. Prophylactic antibiotics were started before the procedure and continued for 24 hours.  There were no complications during the procedure and patient tolerated the procedure well.  The patient was transferred to recovery room in stable condition and subsequently to surgical floor.  Patient was given SCD’s for DVT prophylaxis.  All home medications were continued.  The patient received physical therapy daily and daily labs were followed.  The dressing was kept clean, dry, intact. The Drain Was removed when output was appropriately decreased.  The rest of the hospital stay was unremarkable. The patient was discharged in stable condition to follow up as outpatient. The patient is a 64 year old Male status post PSF of  L3-S1 after failing conservative management h for intractable Low Back Pain with Radiculopathy. The Patient was medically Optimized for the Previously mentioned surgical procedure. The patient was taken to the operating room on date mentioned above. Prophylactic antibiotics were started before the procedure and continued for 24 hours.  There were no complications during the procedure and patient tolerated the procedure well.  The patient was transferred to recovery room in stable condition and subsequently to surgical floor.  Patient was given SCD’s for DVT prophylaxis.  All home medications were continued.  The patient received physical therapy daily and daily labs were followed.  The dressing was kept clean, dry, intact. The Drain Was removed when output was appropriately decreased.  On POD 3 the patient was having continued urinary retention, Consult was placed to urologist, Dr Awan who placed a lozada catheter, Pt advised to FU with Dr Awan in the office for continued management. The rest of the hospital stay was unremarkable. The patient was discharged in stable condition to follow up as outpatient.

## 2019-06-12 NOTE — PATIENT PROFILE ADULT - IS THERE A SUSPICION OF ABUSE/NEGLIGENCE?
"Anesthesia Transfer of Care Note    Patient: Lucia Cary    Procedure(s) Performed: Procedure(s) (LRB):  ADENOIDECTOMY (N/A)  MYRINGOTOMY, WITH TYMPANOSTOMY TUBE INSERTION (Bilateral)    Patient location: PACU (Transported with 100% O2 blowby)    Anesthesia Type: general    Transport from OR: Transported from OR on 100% O2 by closed face mask with adequate spontaneous ventilation    Post pain: adequate analgesia    Post assessment: no apparent anesthetic complications    Post vital signs: stable    Level of consciousness: responds to stimulation and sedated (jessica with stimulation)    Nausea/Vomiting: no nausea/vomiting    Complications: none    Transfer of care protocol was followed      Last vitals:   Visit Vitals  BP (!) 95/59 (BP Location: Left arm, Patient Position: Sitting)   Pulse 99   Temp 36.7 °C (98.1 °F) (Skin)   Resp (!) 19   Ht 3' 9" (1.143 m)   Wt 21.3 kg (47 lb)   SpO2 97%   BMI 16.32 kg/m²     "
no

## 2019-06-12 NOTE — PROGRESS NOTE ADULT - ASSESSMENT
63 yo M s/p L3-S1 PSF POD 0  Analgesia- PCA  DVT ppx- SCD's and early ambulation  Monitor HMV and ANGELA output, high output for HMV d/w pacu to take off suction for now and hang to gravity, will return back to suction in 1-2 hrs  Ancef while drains in place  Incentive spirometry  WBAT in LSO brace, LSO brace to arrive in AM  P/T  D/C neville when pt ambulating   Discharge planning

## 2019-06-12 NOTE — DISCHARGE NOTE PROVIDER - CARE PROVIDERS DIRECT ADDRESSES
,DirectAddress_Unknown ,DirectAddress_Unknown,[email or web address]

## 2019-06-12 NOTE — CONSULT NOTE ADULT - SUBJECTIVE AND OBJECTIVE BOX
c/c: back pain    HPI: 64M, pmh of lumbar disc disease with radiculopathy, atrial flutter s/p ablation on pradaxa, asthma, sleep apnea (does not use device), DMII, BPH, who is admitted for lumbar decompression/fusion. Hospitalist service consulted for medical comanagement. Pt seen and examined in PACU. c/o back pain. No chest pain/sob. No n/v. No parasthesias.     ROS: all 10 systems reviewed and is as above otherwise negative.     PMH: as above  PSH: cardiac ablation, cholecystectomy, partial colon resection, left hip replacement.   F/H: father  MI, mother-lung mass  Social: no tobacco use, social etoh  Allergies: flomax, meloxicam, diclofenac  Home meds: see med rec    Vital Signs Last 24 Hrs  T(C): 37 (12 Jun 2019 15:41), Max: 37 (12 Jun 2019 15:41)  T(F): 98.6 (12 Jun 2019 15:41), Max: 98.6 (12 Jun 2019 15:41)  HR: 97 (12 Jun 2019 16:00) (83 - 97)  BP: 149/75 (12 Jun 2019 16:00) (119/87 - 182/89)  BP(mean): --  RR: 18 (12 Jun 2019 16:00) (16 - 24)  SpO2: 96% (12 Jun 2019 16:00) (92% - 98%)    PHYSICAL EXAM:    GENERAL: Obese, lethargic, arousable.  HEAD:  Normocephalic, atraumatic  EYES: EOMI, PERRLA  HEENT: dry mucous membranes  NECK: Supple, No JVD  NERVOUS SYSTEM:  non focal.  CHEST/LUNG: Clear to auscultation bilaterally  HEART: Regular rate and rhythm  ABDOMEN: Soft, Nontender, Nondistended, Bowel sounds present  GENITOURINARY: +lozada  EXTREMITIES:   No clubbing, cyanosis, or edema  MUSCULOSKELETAL- +hemovac,   SKIN-no rash    LABS:  pending.      MEDICATIONS  (STANDING):  HYDROmorphone PCA (1 mG/mL) 30 milliLiter(s) PCA Continuous PCA Continuous  lactated ringers. 1000 milliLiter(s) (75 mL/Hr) IV Continuous <Continuous>    MEDICATIONS  (PRN):  naloxone Injectable 0.1 milliGRAM(s) IV Push every 3 minutes PRN For ANY of the following changes in patient status:  A. RR LESS THAN 10 breaths per minute, B. Oxygen saturation LESS THAN 90%, C. Sedation score of 6  ondansetron Injectable 4 milliGRAM(s) IV Push every 6 hours PRN Nausea      ASSESSMENT AND PLAN:  64m, PMH as above a/w:    1. Lumbar disc herniation with radiculopathy s/p L3-4 decompression/fusion:  -pod#0  -pain control  -monitor hemovac output  -f/u labs.  -incentive spirometer  -physical therapy    2. Atrial fibrillation s/p ablation:  -currently in sinus  -resume pradaxa when ok with spine.     3. Sleep apnea:  -does not use device.    4. DMII:  -hold ohas  -ss    5. DVT px.    Thank you, will follow.

## 2019-06-13 ENCOUNTER — RESULT REVIEW (OUTPATIENT)
Age: 64
End: 2019-06-13

## 2019-06-13 LAB
ANION GAP SERPL CALC-SCNC: 7 MMOL/L — SIGNIFICANT CHANGE UP (ref 5–17)
BASOPHILS # BLD AUTO: 0.02 K/UL — SIGNIFICANT CHANGE UP (ref 0–0.2)
BASOPHILS NFR BLD AUTO: 0.2 % — SIGNIFICANT CHANGE UP (ref 0–2)
BUN SERPL-MCNC: 16 MG/DL — SIGNIFICANT CHANGE UP (ref 7–23)
CALCIUM SERPL-MCNC: 8.3 MG/DL — LOW (ref 8.5–10.1)
CHLORIDE SERPL-SCNC: 104 MMOL/L — SIGNIFICANT CHANGE UP (ref 96–108)
CO2 SERPL-SCNC: 27 MMOL/L — SIGNIFICANT CHANGE UP (ref 22–31)
CREAT SERPL-MCNC: 0.91 MG/DL — SIGNIFICANT CHANGE UP (ref 0.5–1.3)
EOSINOPHIL # BLD AUTO: 0.02 K/UL — SIGNIFICANT CHANGE UP (ref 0–0.5)
EOSINOPHIL NFR BLD AUTO: 0.2 % — SIGNIFICANT CHANGE UP (ref 0–6)
GLUCOSE BLDC GLUCOMTR-MCNC: 137 MG/DL — HIGH (ref 70–99)
GLUCOSE BLDC GLUCOMTR-MCNC: 150 MG/DL — HIGH (ref 70–99)
GLUCOSE SERPL-MCNC: 150 MG/DL — HIGH (ref 70–99)
HCT VFR BLD CALC: 33.3 % — LOW (ref 39–50)
HGB BLD-MCNC: 10.6 G/DL — LOW (ref 13–17)
IMM GRANULOCYTES NFR BLD AUTO: 0.8 % — SIGNIFICANT CHANGE UP (ref 0–1.5)
LYMPHOCYTES # BLD AUTO: 1.07 K/UL — SIGNIFICANT CHANGE UP (ref 1–3.3)
LYMPHOCYTES # BLD AUTO: 8.6 % — LOW (ref 13–44)
MCHC RBC-ENTMCNC: 28 PG — SIGNIFICANT CHANGE UP (ref 27–34)
MCHC RBC-ENTMCNC: 31.8 GM/DL — LOW (ref 32–36)
MCV RBC AUTO: 88.1 FL — SIGNIFICANT CHANGE UP (ref 80–100)
MONOCYTES # BLD AUTO: 1.43 K/UL — HIGH (ref 0–0.9)
MONOCYTES NFR BLD AUTO: 11.4 % — SIGNIFICANT CHANGE UP (ref 2–14)
NEUTROPHILS # BLD AUTO: 9.85 K/UL — HIGH (ref 1.8–7.4)
NEUTROPHILS NFR BLD AUTO: 78.8 % — HIGH (ref 43–77)
PLATELET # BLD AUTO: 192 K/UL — SIGNIFICANT CHANGE UP (ref 150–400)
POTASSIUM SERPL-MCNC: 4 MMOL/L — SIGNIFICANT CHANGE UP (ref 3.5–5.3)
POTASSIUM SERPL-SCNC: 4 MMOL/L — SIGNIFICANT CHANGE UP (ref 3.5–5.3)
RBC # BLD: 3.78 M/UL — LOW (ref 4.2–5.8)
RBC # FLD: 14.6 % — HIGH (ref 10.3–14.5)
SODIUM SERPL-SCNC: 138 MMOL/L — SIGNIFICANT CHANGE UP (ref 135–145)
WBC # BLD: 12.49 K/UL — HIGH (ref 3.8–10.5)
WBC # FLD AUTO: 12.49 K/UL — HIGH (ref 3.8–10.5)

## 2019-06-13 PROCEDURE — 88304 TISSUE EXAM BY PATHOLOGIST: CPT | Mod: 26

## 2019-06-13 PROCEDURE — 72100 X-RAY EXAM L-S SPINE 2/3 VWS: CPT | Mod: 26

## 2019-06-13 RX ADMIN — Medication 1000 UNIT(S): at 19:54

## 2019-06-13 RX ADMIN — Medication 1 TABLET(S): at 13:15

## 2019-06-13 RX ADMIN — Medication 100 MILLIGRAM(S): at 20:34

## 2019-06-13 RX ADMIN — GABAPENTIN 400 MILLIGRAM(S): 400 CAPSULE ORAL at 13:15

## 2019-06-13 RX ADMIN — GABAPENTIN 400 MILLIGRAM(S): 400 CAPSULE ORAL at 20:34

## 2019-06-13 RX ADMIN — SENNA PLUS 2 TABLET(S): 8.6 TABLET ORAL at 20:34

## 2019-06-13 RX ADMIN — Medication 975 MILLIGRAM(S): at 13:14

## 2019-06-13 RX ADMIN — METHOCARBAMOL 750 MILLIGRAM(S): 500 TABLET, FILM COATED ORAL at 13:15

## 2019-06-13 RX ADMIN — METHOCARBAMOL 750 MILLIGRAM(S): 500 TABLET, FILM COATED ORAL at 05:58

## 2019-06-13 RX ADMIN — METHOCARBAMOL 750 MILLIGRAM(S): 500 TABLET, FILM COATED ORAL at 19:45

## 2019-06-13 RX ADMIN — Medication 975 MILLIGRAM(S): at 14:14

## 2019-06-13 RX ADMIN — Medication 100 MILLIGRAM(S): at 05:58

## 2019-06-13 RX ADMIN — GABAPENTIN 400 MILLIGRAM(S): 400 CAPSULE ORAL at 05:57

## 2019-06-13 RX ADMIN — Medication 975 MILLIGRAM(S): at 20:33

## 2019-06-13 RX ADMIN — Medication 100 MILLIGRAM(S): at 13:15

## 2019-06-13 RX ADMIN — Medication 975 MILLIGRAM(S): at 05:58

## 2019-06-13 NOTE — PROGRESS NOTE ADULT - SUBJECTIVE AND OBJECTIVE BOX
c/c: back pain    HPI: 64M, pmh of lumbar disc disease with radiculopathy, atrial flutter s/p ablation on pradaxa, asthma, sleep apnea (does not use device), DMII, BPH, who is admitted for lumbar decompression/fusion. Hospitalist service consulted for medical comanagement.     6/13: pt seen and examined this am. c/o back pain. Motivated to work with physical therapy. No parasthesias. No n/v. Waiting for breakfast.    ROS: all 10 systems reviewed and is as above otherwise negative.     Vital Signs Last 24 Hrs  T(C): 36.2 (13 Jun 2019 11:47), Max: 37.2 (12 Jun 2019 20:00)  T(F): 97.1 (13 Jun 2019 11:47), Max: 98.9 (12 Jun 2019 20:00)  HR: 115 (13 Jun 2019 11:47) (83 - 115)  BP: 111/69 (13 Jun 2019 11:47) (111/69 - 182/89)  RR: 18 (13 Jun 2019 11:47) (12 - 24)  SpO2: 97% (13 Jun 2019 11:47) (92% - 99%)    PHYSICAL EXAM:    GENERAL: appears well, nad  HEAD:  Normocephalic, atraumatic  EYES: EOMI, PERRLA  HEENT: moist mucous membranes  NECK: Supple, No JVD   NERVOUS SYSTEM:  non focal.  CHEST/LUNG: Clear to auscultation bilaterally  HEART: Regular rate and rhythm  ABDOMEN: Soft, Nontender, Nondistended, Bowel sounds present  GENITOURINARY: +lozada  EXTREMITIES:   No clubbing, cyanosis, or edema  MUSCULOSKELETAL- +hemovac, back dressing c/d/i   SKIN-no rash  LABS:                        10.6   12.49 )-----------( 192      ( 13 Jun 2019 07:23 )             33.3     06-13    138  |  104  |  16  ----------------------------<  150<H>  4.0   |  27  |  0.91    Ca    8.3<L>      13 Jun 2019 07:23      MEDICATIONS  (STANDING):  acetaminophen   Tablet .. 975 milliGRAM(s) Oral every 6 hours  cholecalciferol 1000 Unit(s) Oral daily  dextrose 5%. 1000 milliLiter(s) (50 mL/Hr) IV Continuous <Continuous>  dextrose 50% Injectable 12.5 Gram(s) IV Push once  dextrose 50% Injectable 25 Gram(s) IV Push once  dextrose 50% Injectable 25 Gram(s) IV Push once  docusate sodium 100 milliGRAM(s) Oral three times a day  gabapentin 400 milliGRAM(s) Oral three times a day  HYDROmorphone PCA (1 mG/mL) 30 milliLiter(s) PCA Continuous PCA Continuous  insulin lispro (HumaLOG) corrective regimen sliding scale   SubCutaneous three times a day before meals  lactated ringers. 1000 milliLiter(s) (100 mL/Hr) IV Continuous <Continuous>  methocarbamol 750 milliGRAM(s) Oral four times a day  multivitamin 1 Tablet(s) Oral daily  senna 2 Tablet(s) Oral at bedtime    MEDICATIONS  (PRN):  dextrose 40% Gel 15 Gram(s) Oral once PRN Blood Glucose LESS THAN 70 milliGRAM(s)/deciliter  glucagon  Injectable 1 milliGRAM(s) IntraMuscular once PRN Glucose LESS THAN 70 milligrams/deciliter  HYDROmorphone  Injectable 1 milliGRAM(s) SubCutaneous every 6 hours PRN Moderate Pain (4 - 6)  naloxone Injectable 0.1 milliGRAM(s) IV Push every 3 minutes PRN For ANY of the following changes in patient status:  A. RR LESS THAN 10 breaths per minute, B. Oxygen saturation LESS THAN 90%, C. Sedation score of 6  ondansetron Injectable 4 milliGRAM(s) IV Push every 6 hours PRN Nausea  oxyCODONE    IR 10 milliGRAM(s) Oral every 4 hours PRN Moderate Pain (4 - 6)  prochlorperazine   Injectable 10 milliGRAM(s) IV Push every 8 hours PRN Nausea/vomiting    ASSESSMENT AND PLAN:  64m, PMH as above a/w:    1. Lumbar disc herniation with radiculopathy s/p L3-4 decompression/fusion:  -pod#1  -pain control  -monitor hemovac output.  -incentive spirometer  -physical therapy    2. Acute blood loss anemia:  -due to surgery  -monitor hemovac outpt    3. Atrial fibrillation s/p ablation:  -currently in sinus  -not on rate controlling agents.  -resume pradaxa when ok with spine.     4. Sleep apnea:  -does not use device.  -using cpap here.    5. DMII:  -hold ohas  -ss    6. DVT px.    Thank you, will follow.   no medical contraindications for transfer to     d/w rn

## 2019-06-13 NOTE — PHYSICAL THERAPY INITIAL EVALUATION ADULT - MODALITIES TREATMENT COMMENTS
Pt left OOB in chair; All lines and tubes intact; CBIR; Pt instructed to not get up alone; SHON Ramirez made aware pt left OOB in chair; Chair alarm donned Pt left OOB in chair; All lines and tubes intact; CBIR; Pt instructed to not get up alone; SHON Ramirez made aware pt left OOB in chair; Chair alarm donned; Pt denied any pain or discomfort

## 2019-06-13 NOTE — PROGRESS NOTE ADULT - ASSESSMENT
65 yo M s/p L3-S1 PSF POD 1  Analgesia- PCA  DVT ppx- SCD's and early ambulation  Monitor HMV output, maintain suction at all times  Ancef while drains in place  Incentive spirometry  WBAT in LSO brace, LSO brace to arrive in AM  P/T  D/C [place] when pt ambulating   Discharge planning

## 2019-06-13 NOTE — PROGRESS NOTE ADULT - SUBJECTIVE AND OBJECTIVE BOX
Pt seen & examined in at bedside, Pain controlled. Tolerated procedure well, denies any numbness/tingling in BLLE.  Denies any CP/SOB.  No Issues overnight, pt in pain but in good spirits this AM.    Vital Signs Last 24 Hrs  T(C): 36.4 (13 Jun 2019 05:11), Max: 37.2 (12 Jun 2019 20:00)  T(F): 97.5 (13 Jun 2019 05:11), Max: 98.9 (12 Jun 2019 20:00)  HR: 100 (13 Jun 2019 05:11) (83 - 100)  BP: 137/69 (13 Jun 2019 05:11) (119/87 - 182/89)  RR: 18 (13 Jun 2019 05:11) (12 - 24)  SpO2: 96% (13 Jun 2019 05:11) (92% - 99%)    Gen: NAD  Spine:  Dressing clean D&I, mild saturation over drain dressing   HMV drains in place and functioning  +Sensation C5-T1 & L2-S1, chronic B/L lateral 5th digit/forearm numbness which  pt states is old  Moving all extremities grossly, 4+/5 in all fields, only limited 2/2 back pain  Distal pulses intact  Soft compartments, - calf tenderness to palpation

## 2019-06-13 NOTE — PHYSICAL THERAPY INITIAL EVALUATION ADULT - GENERAL OBSERVATIONS, REHAB EVAL
Pt rec'd supine in bed; B SCD's, Velez, PCA, IV, 2 Hemovac Drains, NC (3L/min); Pulse Oxy, HM all intact; SaO2 (95%); HR (104); /78; Pt denied any pain or discomfort

## 2019-06-13 NOTE — PHYSICAL THERAPY INITIAL EVALUATION ADULT - REFERRING PHYSICIAN, REHAB EVAL
Jerardo Martinez M - Safe to transfer OOB and Ambulate with PT without back brace as per telephone conversation with Bob from Dr. Atwood's office (6/13/19 @ 11:05 AM)

## 2019-06-14 LAB
ANION GAP SERPL CALC-SCNC: 8 MMOL/L — SIGNIFICANT CHANGE UP (ref 5–17)
BASOPHILS # BLD AUTO: 0.05 K/UL — SIGNIFICANT CHANGE UP (ref 0–0.2)
BASOPHILS NFR BLD AUTO: 0.3 % — SIGNIFICANT CHANGE UP (ref 0–2)
BUN SERPL-MCNC: 15 MG/DL — SIGNIFICANT CHANGE UP (ref 7–23)
CALCIUM SERPL-MCNC: 8.7 MG/DL — SIGNIFICANT CHANGE UP (ref 8.5–10.1)
CHLORIDE SERPL-SCNC: 100 MMOL/L — SIGNIFICANT CHANGE UP (ref 96–108)
CO2 SERPL-SCNC: 26 MMOL/L — SIGNIFICANT CHANGE UP (ref 22–31)
CREAT SERPL-MCNC: 0.99 MG/DL — SIGNIFICANT CHANGE UP (ref 0.5–1.3)
EOSINOPHIL # BLD AUTO: 0.02 K/UL — SIGNIFICANT CHANGE UP (ref 0–0.5)
EOSINOPHIL NFR BLD AUTO: 0.1 % — SIGNIFICANT CHANGE UP (ref 0–6)
GLUCOSE BLDC GLUCOMTR-MCNC: 137 MG/DL — HIGH (ref 70–99)
GLUCOSE BLDC GLUCOMTR-MCNC: 167 MG/DL — HIGH (ref 70–99)
GLUCOSE BLDC GLUCOMTR-MCNC: 198 MG/DL — HIGH (ref 70–99)
GLUCOSE SERPL-MCNC: 182 MG/DL — HIGH (ref 70–99)
HCT VFR BLD CALC: 33.5 % — LOW (ref 39–50)
HGB BLD-MCNC: 10.6 G/DL — LOW (ref 13–17)
IMM GRANULOCYTES NFR BLD AUTO: 0.7 % — SIGNIFICANT CHANGE UP (ref 0–1.5)
LYMPHOCYTES # BLD AUTO: 1.62 K/UL — SIGNIFICANT CHANGE UP (ref 1–3.3)
LYMPHOCYTES # BLD AUTO: 9.7 % — LOW (ref 13–44)
MANUAL SMEAR VERIFICATION: SIGNIFICANT CHANGE UP
MCHC RBC-ENTMCNC: 28 PG — SIGNIFICANT CHANGE UP (ref 27–34)
MCHC RBC-ENTMCNC: 31.6 GM/DL — LOW (ref 32–36)
MCV RBC AUTO: 88.4 FL — SIGNIFICANT CHANGE UP (ref 80–100)
MONOCYTES # BLD AUTO: 1.55 K/UL — HIGH (ref 0–0.9)
MONOCYTES NFR BLD AUTO: 9.2 % — SIGNIFICANT CHANGE UP (ref 2–14)
NEUTROPHILS # BLD AUTO: 13.43 K/UL — HIGH (ref 1.8–7.4)
NEUTROPHILS NFR BLD AUTO: 80 % — HIGH (ref 43–77)
PLAT MORPH BLD: NORMAL — SIGNIFICANT CHANGE UP
PLATELET # BLD AUTO: 217 K/UL — SIGNIFICANT CHANGE UP (ref 150–400)
POTASSIUM SERPL-MCNC: 3.8 MMOL/L — SIGNIFICANT CHANGE UP (ref 3.5–5.3)
POTASSIUM SERPL-SCNC: 3.8 MMOL/L — SIGNIFICANT CHANGE UP (ref 3.5–5.3)
RBC # BLD: 3.79 M/UL — LOW (ref 4.2–5.8)
RBC # FLD: 14.6 % — HIGH (ref 10.3–14.5)
RBC BLD AUTO: SIGNIFICANT CHANGE UP
SODIUM SERPL-SCNC: 134 MMOL/L — LOW (ref 135–145)
WBC # BLD: 16.78 K/UL — HIGH (ref 3.8–10.5)
WBC # FLD AUTO: 16.78 K/UL — HIGH (ref 3.8–10.5)

## 2019-06-14 RX ORDER — HYDROMORPHONE HYDROCHLORIDE 2 MG/ML
1 INJECTION INTRAMUSCULAR; INTRAVENOUS; SUBCUTANEOUS EVERY 4 HOURS
Refills: 0 | Status: DISCONTINUED | OUTPATIENT
Start: 2019-06-14 | End: 2019-06-17

## 2019-06-14 RX ORDER — OXYCODONE HYDROCHLORIDE 5 MG/1
5 TABLET ORAL EVERY 4 HOURS
Refills: 0 | Status: DISCONTINUED | OUTPATIENT
Start: 2019-06-14 | End: 2019-06-17

## 2019-06-14 RX ORDER — OXYCODONE HYDROCHLORIDE 5 MG/1
10 TABLET ORAL EVERY 4 HOURS
Refills: 0 | Status: DISCONTINUED | OUTPATIENT
Start: 2019-06-14 | End: 2019-06-17

## 2019-06-14 RX ORDER — CEFAZOLIN SODIUM 1 G
2000 VIAL (EA) INJECTION EVERY 8 HOURS
Refills: 0 | Status: DISCONTINUED | OUTPATIENT
Start: 2019-06-14 | End: 2019-06-15

## 2019-06-14 RX ADMIN — HYDROMORPHONE HYDROCHLORIDE 1 MILLIGRAM(S): 2 INJECTION INTRAMUSCULAR; INTRAVENOUS; SUBCUTANEOUS at 15:45

## 2019-06-14 RX ADMIN — SODIUM CHLORIDE 100 MILLILITER(S): 9 INJECTION, SOLUTION INTRAVENOUS at 00:49

## 2019-06-14 RX ADMIN — SENNA PLUS 2 TABLET(S): 8.6 TABLET ORAL at 20:56

## 2019-06-14 RX ADMIN — Medication 975 MILLIGRAM(S): at 23:21

## 2019-06-14 RX ADMIN — GABAPENTIN 400 MILLIGRAM(S): 400 CAPSULE ORAL at 05:20

## 2019-06-14 RX ADMIN — METHOCARBAMOL 750 MILLIGRAM(S): 500 TABLET, FILM COATED ORAL at 23:25

## 2019-06-14 RX ADMIN — SODIUM CHLORIDE 100 MILLILITER(S): 9 INJECTION, SOLUTION INTRAVENOUS at 11:19

## 2019-06-14 RX ADMIN — GABAPENTIN 400 MILLIGRAM(S): 400 CAPSULE ORAL at 14:17

## 2019-06-14 RX ADMIN — METHOCARBAMOL 750 MILLIGRAM(S): 500 TABLET, FILM COATED ORAL at 18:12

## 2019-06-14 RX ADMIN — METHOCARBAMOL 750 MILLIGRAM(S): 500 TABLET, FILM COATED ORAL at 05:21

## 2019-06-14 RX ADMIN — Medication 100 MILLIGRAM(S): at 05:21

## 2019-06-14 RX ADMIN — METHOCARBAMOL 750 MILLIGRAM(S): 500 TABLET, FILM COATED ORAL at 14:17

## 2019-06-14 RX ADMIN — Medication 100 MILLIGRAM(S): at 14:17

## 2019-06-14 RX ADMIN — Medication 1000 UNIT(S): at 10:18

## 2019-06-14 RX ADMIN — Medication 975 MILLIGRAM(S): at 11:17

## 2019-06-14 RX ADMIN — Medication 975 MILLIGRAM(S): at 11:47

## 2019-06-14 RX ADMIN — GABAPENTIN 400 MILLIGRAM(S): 400 CAPSULE ORAL at 20:56

## 2019-06-14 RX ADMIN — OXYCODONE HYDROCHLORIDE 10 MILLIGRAM(S): 5 TABLET ORAL at 10:17

## 2019-06-14 RX ADMIN — Medication 100 MILLIGRAM(S): at 20:56

## 2019-06-14 RX ADMIN — Medication 975 MILLIGRAM(S): at 18:13

## 2019-06-14 RX ADMIN — Medication 1 TABLET(S): at 10:17

## 2019-06-14 RX ADMIN — OXYCODONE HYDROCHLORIDE 10 MILLIGRAM(S): 5 TABLET ORAL at 20:55

## 2019-06-14 RX ADMIN — HYDROMORPHONE HYDROCHLORIDE 1 MILLIGRAM(S): 2 INJECTION INTRAMUSCULAR; INTRAVENOUS; SUBCUTANEOUS at 15:15

## 2019-06-14 RX ADMIN — OXYCODONE HYDROCHLORIDE 10 MILLIGRAM(S): 5 TABLET ORAL at 05:20

## 2019-06-14 RX ADMIN — Medication 975 MILLIGRAM(S): at 05:20

## 2019-06-14 RX ADMIN — METHOCARBAMOL 750 MILLIGRAM(S): 500 TABLET, FILM COATED ORAL at 00:45

## 2019-06-14 NOTE — PROGRESS NOTE ADULT - ASSESSMENT
A/P: 64M s/p L3-S1 PSF  DC'd PCA & Velez this AM  FU LSO Brace from Dr. Horan (ok to ambulate without brace until arrives)  Analgesia  DVT ppx- SCDs  WBAT  PT/OT  FU Labs  Monitor/record drain output  DC planning

## 2019-06-14 NOTE — PROGRESS NOTE ADULT - SUBJECTIVE AND OBJECTIVE BOX
c/c: back pain    HPI: 64M, pmh of lumbar disc disease with radiculopathy, atrial flutter s/p ablation on pradaxa, asthma, sleep apnea (does not use device), DMII, BPH, who is admitted for lumbar decompression/fusion. Hospitalist service consulted for medical comanagement.     6/14: pt seen and examined this am. Felt ok. was waiting for brace and physical therapy. No difficulty voiding. Tolerating po    ROS: all 10 systems reviewed and is as above otherwise negative.     Vital Signs Last 24 Hrs  T(C): 36.8 (14 Jun 2019 11:18), Max: 37.6 (13 Jun 2019 20:13)  T(F): 98.3 (14 Jun 2019 11:18), Max: 99.7 (13 Jun 2019 20:13)  HR: 107 (14 Jun 2019 11:18) (107 - 116)  BP: 116/55 (14 Jun 2019 11:18) (116/55 - 133/63)  RR: 17 (14 Jun 2019 11:18) (17 - 17)  SpO2: 95% (14 Jun 2019 11:18) (94% - 95%)    PHYSICAL EXAM:    GENERAL: appears well, nad  HEAD:  Normocephalic, atraumatic  EYES: EOMI, PERRLA  HEENT: moist mucous membranes  NECK: Supple, No JVD   NERVOUS SYSTEM:  non focal.  CHEST/LUNG: Clear to auscultation bilaterally  HEART: Regular rate and rhythm  ABDOMEN: Soft, Nontender, Nondistended, Bowel sounds present  GENITOURINARY: +lozada  EXTREMITIES:   No clubbing, cyanosis, or edema  MUSCULOSKELETAL- +hemovac, back dressing c/d/i   SKIN-no rash    LABS:                        10.6   16.78 )-----------( 217      ( 14 Jun 2019 07:45 )             33.5     06-14    134<L>  |  100  |  15  ----------------------------<  182<H>  3.8   |  26  |  0.99    Ca    8.7      14 Jun 2019 07:45    MEDICATIONS  (STANDING):  acetaminophen   Tablet .. 975 milliGRAM(s) Oral every 6 hours  cholecalciferol 1000 Unit(s) Oral daily  dextrose 5%. 1000 milliLiter(s) (50 mL/Hr) IV Continuous <Continuous>  dextrose 50% Injectable 12.5 Gram(s) IV Push once  dextrose 50% Injectable 25 Gram(s) IV Push once  dextrose 50% Injectable 25 Gram(s) IV Push once  docusate sodium 100 milliGRAM(s) Oral three times a day  gabapentin 400 milliGRAM(s) Oral three times a day  insulin lispro (HumaLOG) corrective regimen sliding scale   SubCutaneous three times a day before meals  lactated ringers. 1000 milliLiter(s) (100 mL/Hr) IV Continuous <Continuous>  methocarbamol 750 milliGRAM(s) Oral four times a day  multivitamin 1 Tablet(s) Oral daily  senna 2 Tablet(s) Oral at bedtime    MEDICATIONS  (PRN):  dextrose 40% Gel 15 Gram(s) Oral once PRN Blood Glucose LESS THAN 70 milliGRAM(s)/deciliter  glucagon  Injectable 1 milliGRAM(s) IntraMuscular once PRN Glucose LESS THAN 70 milligrams/deciliter  HYDROmorphone  Injectable 1 milliGRAM(s) IV Push every 4 hours PRN Severe Pain (7 - 10)  naloxone Injectable 0.1 milliGRAM(s) IV Push every 3 minutes PRN For ANY of the following changes in patient status:  A. RR LESS THAN 10 breaths per minute, B. Oxygen saturation LESS THAN 90%, C. Sedation score of 6  ondansetron Injectable 4 milliGRAM(s) IV Push every 6 hours PRN Nausea  oxyCODONE    IR 5 milliGRAM(s) Oral every 4 hours PRN Mild Pain (1 - 3)  oxyCODONE    IR 10 milliGRAM(s) Oral every 4 hours PRN Moderate Pain (4 - 6)  prochlorperazine   Injectable 10 milliGRAM(s) IV Push every 8 hours PRN Nausea/vomiting    ASSESSMENT AND PLAN:  64m, PMH as above a/w:    1. Lumbar disc herniation with radiculopathy s/p L3-4 decompression/fusion:  -pod#2  -pain control  -monitor hemovac output.  -incentive spirometer  -physical therapy    2. Acute blood loss anemia:  -due to surgery  -monitor hemovac outpt    3. Atrial fibrillation s/p ablation:  -not on rate controlling agents.  -resume pradaxa when ok with spine.     4. Sleep apnea:  -does not use device.  -using cpap here.    5. DMII:  -hold ohas  -ss    6. DVT px.    transfer to 2N oif ok with ortho

## 2019-06-14 NOTE — PROGRESS NOTE ADULT - SUBJECTIVE AND OBJECTIVE BOX
Pt S/E at bedside, no acute events overnight, pain controlled - patient sitting comfortably in chair, has been OOB and ambulating with walker    Vital Signs Last 24 Hrs  T(C): 37.6 (13 Jun 2019 20:13), Max: 37.6 (13 Jun 2019 20:13)  T(F): 99.7 (13 Jun 2019 20:13), Max: 99.7 (13 Jun 2019 20:13)  HR: 116 (13 Jun 2019 20:13) (100 - 116)  BP: 133/63 (13 Jun 2019 20:13) (111/69 - 137/69)  BP(mean): --  RR: 17 (13 Jun 2019 20:13) (17 - 18)  SpO2: 94% (13 Jun 2019 20:13) (94% - 97%)    Gen: NAD    Spine:  Dressing clean dry intact, +HMV x2  +EHL/FHL/TA/GS  +AIN/PIN/M/R/U/Msc/Ax  SILT L3-S1  SILT C5-T1  +DP/PT Pulses  +Radial Pulse  Compartments soft  No calf TTP B/L

## 2019-06-15 LAB
ANION GAP SERPL CALC-SCNC: 7 MMOL/L — SIGNIFICANT CHANGE UP (ref 5–17)
BASOPHILS # BLD AUTO: 0.03 K/UL — SIGNIFICANT CHANGE UP (ref 0–0.2)
BASOPHILS NFR BLD AUTO: 0.2 % — SIGNIFICANT CHANGE UP (ref 0–2)
BUN SERPL-MCNC: 13 MG/DL — SIGNIFICANT CHANGE UP (ref 7–23)
CALCIUM SERPL-MCNC: 8.9 MG/DL — SIGNIFICANT CHANGE UP (ref 8.5–10.1)
CHLORIDE SERPL-SCNC: 100 MMOL/L — SIGNIFICANT CHANGE UP (ref 96–108)
CO2 SERPL-SCNC: 27 MMOL/L — SIGNIFICANT CHANGE UP (ref 22–31)
CREAT SERPL-MCNC: 0.94 MG/DL — SIGNIFICANT CHANGE UP (ref 0.5–1.3)
EOSINOPHIL # BLD AUTO: 0.03 K/UL — SIGNIFICANT CHANGE UP (ref 0–0.5)
EOSINOPHIL NFR BLD AUTO: 0.2 % — SIGNIFICANT CHANGE UP (ref 0–6)
GLUCOSE BLDC GLUCOMTR-MCNC: 128 MG/DL — HIGH (ref 70–99)
GLUCOSE BLDC GLUCOMTR-MCNC: 161 MG/DL — HIGH (ref 70–99)
GLUCOSE BLDC GLUCOMTR-MCNC: 179 MG/DL — HIGH (ref 70–99)
GLUCOSE BLDC GLUCOMTR-MCNC: 186 MG/DL — HIGH (ref 70–99)
GLUCOSE SERPL-MCNC: 159 MG/DL — HIGH (ref 70–99)
HCT VFR BLD CALC: 29.4 % — LOW (ref 39–50)
HGB BLD-MCNC: 9.4 G/DL — LOW (ref 13–17)
IMM GRANULOCYTES NFR BLD AUTO: 0.7 % — SIGNIFICANT CHANGE UP (ref 0–1.5)
LYMPHOCYTES # BLD AUTO: 0.86 K/UL — LOW (ref 1–3.3)
LYMPHOCYTES # BLD AUTO: 6.2 % — LOW (ref 13–44)
MCHC RBC-ENTMCNC: 28.1 PG — SIGNIFICANT CHANGE UP (ref 27–34)
MCHC RBC-ENTMCNC: 32 GM/DL — SIGNIFICANT CHANGE UP (ref 32–36)
MCV RBC AUTO: 88 FL — SIGNIFICANT CHANGE UP (ref 80–100)
MONOCYTES # BLD AUTO: 1.4 K/UL — HIGH (ref 0–0.9)
MONOCYTES NFR BLD AUTO: 10.1 % — SIGNIFICANT CHANGE UP (ref 2–14)
NEUTROPHILS # BLD AUTO: 11.49 K/UL — HIGH (ref 1.8–7.4)
NEUTROPHILS NFR BLD AUTO: 82.6 % — HIGH (ref 43–77)
PLATELET # BLD AUTO: 207 K/UL — SIGNIFICANT CHANGE UP (ref 150–400)
POTASSIUM SERPL-MCNC: 3.8 MMOL/L — SIGNIFICANT CHANGE UP (ref 3.5–5.3)
POTASSIUM SERPL-SCNC: 3.8 MMOL/L — SIGNIFICANT CHANGE UP (ref 3.5–5.3)
RBC # BLD: 3.34 M/UL — LOW (ref 4.2–5.8)
RBC # FLD: 14.5 % — SIGNIFICANT CHANGE UP (ref 10.3–14.5)
SODIUM SERPL-SCNC: 134 MMOL/L — LOW (ref 135–145)
WBC # BLD: 13.91 K/UL — HIGH (ref 3.8–10.5)
WBC # FLD AUTO: 13.91 K/UL — HIGH (ref 3.8–10.5)

## 2019-06-15 PROCEDURE — 99222 1ST HOSP IP/OBS MODERATE 55: CPT

## 2019-06-15 RX ORDER — TAMSULOSIN HYDROCHLORIDE 0.4 MG/1
0.4 CAPSULE ORAL AT BEDTIME
Refills: 0 | Status: DISCONTINUED | OUTPATIENT
Start: 2019-06-15 | End: 2019-06-17

## 2019-06-15 RX ORDER — METHOCARBAMOL 500 MG/1
1 TABLET, FILM COATED ORAL
Qty: 0 | Refills: 0 | DISCHARGE

## 2019-06-15 RX ORDER — POLYETHYLENE GLYCOL 3350 17 G/17G
17 POWDER, FOR SOLUTION ORAL DAILY
Refills: 0 | Status: DISCONTINUED | OUTPATIENT
Start: 2019-06-15 | End: 2019-06-17

## 2019-06-15 RX ORDER — CHOLECALCIFEROL (VITAMIN D3) 125 MCG
1 CAPSULE ORAL
Qty: 0 | Refills: 0 | DISCHARGE

## 2019-06-15 RX ORDER — OXYCODONE HYDROCHLORIDE 5 MG/1
1 TABLET ORAL
Qty: 0 | Refills: 0 | DISCHARGE

## 2019-06-15 RX ORDER — ACETAMINOPHEN 500 MG
2 TABLET ORAL
Qty: 0 | Refills: 0 | DISCHARGE

## 2019-06-15 RX ORDER — MUPIROCIN 20 MG/G
1 OINTMENT TOPICAL
Qty: 0 | Refills: 0 | DISCHARGE

## 2019-06-15 RX ORDER — LACTULOSE 10 G/15ML
20 SOLUTION ORAL
Refills: 0 | Status: DISCONTINUED | OUTPATIENT
Start: 2019-06-15 | End: 2019-06-17

## 2019-06-15 RX ADMIN — Medication 1000 UNIT(S): at 12:08

## 2019-06-15 RX ADMIN — METHOCARBAMOL 750 MILLIGRAM(S): 500 TABLET, FILM COATED ORAL at 23:39

## 2019-06-15 RX ADMIN — Medication 975 MILLIGRAM(S): at 23:39

## 2019-06-15 RX ADMIN — Medication 975 MILLIGRAM(S): at 05:53

## 2019-06-15 RX ADMIN — Medication 975 MILLIGRAM(S): at 23:43

## 2019-06-15 RX ADMIN — Medication 975 MILLIGRAM(S): at 17:32

## 2019-06-15 RX ADMIN — Medication 975 MILLIGRAM(S): at 00:23

## 2019-06-15 RX ADMIN — GABAPENTIN 400 MILLIGRAM(S): 400 CAPSULE ORAL at 14:42

## 2019-06-15 RX ADMIN — Medication 975 MILLIGRAM(S): at 12:06

## 2019-06-15 RX ADMIN — Medication 975 MILLIGRAM(S): at 17:04

## 2019-06-15 RX ADMIN — GABAPENTIN 400 MILLIGRAM(S): 400 CAPSULE ORAL at 22:21

## 2019-06-15 RX ADMIN — GABAPENTIN 400 MILLIGRAM(S): 400 CAPSULE ORAL at 05:54

## 2019-06-15 RX ADMIN — Medication 1 TABLET(S): at 12:08

## 2019-06-15 RX ADMIN — TAMSULOSIN HYDROCHLORIDE 0.4 MILLIGRAM(S): 0.4 CAPSULE ORAL at 22:21

## 2019-06-15 RX ADMIN — METHOCARBAMOL 750 MILLIGRAM(S): 500 TABLET, FILM COATED ORAL at 17:04

## 2019-06-15 RX ADMIN — METHOCARBAMOL 750 MILLIGRAM(S): 500 TABLET, FILM COATED ORAL at 12:07

## 2019-06-15 RX ADMIN — Medication 1: at 08:12

## 2019-06-15 RX ADMIN — OXYCODONE HYDROCHLORIDE 10 MILLIGRAM(S): 5 TABLET ORAL at 05:52

## 2019-06-15 RX ADMIN — Medication 100 MILLIGRAM(S): at 06:07

## 2019-06-15 RX ADMIN — Medication 1: at 12:09

## 2019-06-15 RX ADMIN — METHOCARBAMOL 750 MILLIGRAM(S): 500 TABLET, FILM COATED ORAL at 05:53

## 2019-06-15 RX ADMIN — OXYCODONE HYDROCHLORIDE 10 MILLIGRAM(S): 5 TABLET ORAL at 15:03

## 2019-06-15 RX ADMIN — Medication 100 MILLIGRAM(S): at 05:54

## 2019-06-15 RX ADMIN — LACTULOSE 20 GRAM(S): 10 SOLUTION ORAL at 09:48

## 2019-06-15 NOTE — PROGRESS NOTE ADULT - SUBJECTIVE AND OBJECTIVE BOX
Pt S/E at bedside, pain controlled - patient sitting comfortably in chair, has been OOB and ambulating with walker.  Pt was straight cathed overnight, 1100cc.  Pt accidentally pulled the remaining HMV overnight.     Vital Signs Last 24 Hrs  T(C): 37.7 (15 Robbie 2019 05:06), Max: 37.7 (15 Robbie 2019 05:06)  T(F): 99.8 (15 Robbie 2019 05:06), Max: 99.8 (15 Robbie 2019 05:06)  HR: 108 (15 Robbie 2019 05:06) (107 - 108)  BP: 142/71 (15 Robbie 2019 05:06) (113/57 - 144/69)  BP(mean): --  RR: 17 (14 Jun 2019 20:58) (17 - 17)  SpO2: 93% (15 Robbie 2019 05:06) (92% - 95%)  Gen: NAD    Spine:  Dressing with some mild saturation, will change today.   +EHL/FHL/TA/GS  +AIN/PIN/M/R/U/Msc/Ax  SILT L3-S1  SILT C5-T1  +DP/PT Pulses  +Radial Pulse  Compartments soft  No calf TTP B/L        A/P: 64M s/p L3-S1 PSF    FU LSO Brace from Dr. Horan (ok to ambulate without brace until arrives)  Analgesia  DVT ppx- SCDs  follow urine output  WBAT  PT/OT  FU Labs  DC planning

## 2019-06-15 NOTE — CONSULT NOTE ADULT - ASSESSMENT
65 yo M with post op urinary retention, likely multifactorial due to underlying BPH, anesthesia, narcotics, immobility, and pain. Recommend dc home with lozada to gravity. TOV in office as outpatient.

## 2019-06-15 NOTE — PROGRESS NOTE ADULT - ATTENDING COMMENTS
Patient seen, doing well.  No radicular complaints. Had urinary retention post surgery and was not able to discontinue use of lozada catheter.  Urology consult appreciated and follow up planned with urology.  He is expected to go home with urinary catheter.    Regarding his incision.  Post surgical xrays are personally reviewed.  Instrumentation appears in good position.    His LSO brace is dispensed and fitted to the body habitus.  Instructions for bracing is given.  Wound care is discussed.  Frequency and use of brace is reviewed.  All questions answered.  Patient wishes to proceed.

## 2019-06-15 NOTE — CONSULT NOTE ADULT - SUBJECTIVE AND OBJECTIVE BOX
HPI: 64M, pmh of lumbar disc disease with radiculopathy, atrial flutter s/p ablation on pradaxa, asthma, sleep apnea (does not use device), DMII, BPH, who is admitted for lumbar decompression/fusion. Hospitalist service consulted for medical comanagement.     6/15: Urology consulted for urinary retention. Pt had straight cath x2 for inability to void. At third episode, 800 mL bladder scan resulted in lozada placement. About 1000 mL drained. Pt reports baseline frequency, sensation of incomplete emptying, nocturia 5x/night. His PCP had Rx tamsulosin, but he had not started it. Tolerating lozada now.     Vital Signs Last 24 Hrs  T(C): 37.7 (15 Robbie 2019 05:06), Max: 37.7 (15 Robbie 2019 05:06)  T(F): 99.8 (15 Robbie 2019 05:06), Max: 99.8 (15 Robbie 2019 05:06)  HR: 108 (15 Robbie 2019 05:06) (107 - 108)  BP: 142/71 (15 Robbie 2019 05:06) (113/57 - 144/69)  BP(mean): --  RR: 17 (14 Jun 2019 20:58) (17 - 17)  SpO2: 93% (15 Robbie 2019 05:06) (92% - 93%)    NAD, A+Ox3  EOMI  MMM  RRR  no increased WOB  ABD S NT ND  lozada draining clear yellow urine  no cva tenderness, bladder nonpalpable                          9.4    13.91 )-----------( 207      ( 15 Robbie 2019 06:56 )             29.4     06-15    134<L>  |  100  |  13  ----------------------------<  159<H>  3.8   |  27  |  0.94    Ca    8.9      15 Robbie 2019 06:56

## 2019-06-15 NOTE — PROGRESS NOTE ADULT - SUBJECTIVE AND OBJECTIVE BOX
c/c: back pain    HPI: 64M, pmh of lumbar disc disease with radiculopathy, atrial flutter s/p ablation on pradaxa, asthma, sleep apnea (does not use device), DMII, BPH, who is admitted for lumbar decompression/fusion. Hospitalist service consulted for medical comanagement.     6/15: pt seen and examined this am. c/o constipation. Also with urinary retention last night requiring straight cath.   Hemovac fell out overnight.     ROS: all 10 systems reviewed and is as above otherwise negative.     Vital Signs Last 24 Hrs  T(C): 37.7 (15 Robbie 2019 05:06), Max: 37.7 (15 Robbie 2019 05:06)  T(F): 99.8 (15 Robbie 2019 05:06), Max: 99.8 (15 Robbie 2019 05:06)  HR: 108 (15 Robbie 2019 05:06) (107 - 108)  BP: 142/71 (15 Robbie 2019 05:06) (113/57 - 144/69)  RR: 17 (14 Jun 2019 20:58) (17 - 17)  SpO2: 93% (15 Robbie 2019 05:06) (92% - 93%)    PHYSICAL EXAM:    GENERAL: appears well, nad  HEAD:  Normocephalic, atraumatic  EYES: EOMI, PERRLA  HEENT: moist mucous membranes  NECK: Supple, No JVD   NERVOUS SYSTEM:  non focal.  CHEST/LUNG: Clear to auscultation bilaterally  HEART: Regular rate and rhythm  ABDOMEN: Soft, Nontender, Nondistended, Bowel sounds present  GENITOURINARY: +lozada  EXTREMITIES:   No clubbing, cyanosis, or edema  MUSCULOSKELETAL- back dressing c/d/i  SKIN-no rash    LABS:                        9.4    13.91 )-----------( 207      ( 15 Robbie 2019 06:56 )             29.4     06-15    134<L>  |  100  |  13  ----------------------------<  159<H>  3.8   |  27  |  0.94    Ca    8.9      15 Robbie 2019 06:56      MEDICATIONS  (STANDING):  acetaminophen   Tablet .. 975 milliGRAM(s) Oral every 6 hours  cholecalciferol 1000 Unit(s) Oral daily  dextrose 5%. 1000 milliLiter(s) (50 mL/Hr) IV Continuous <Continuous>  dextrose 50% Injectable 12.5 Gram(s) IV Push once  dextrose 50% Injectable 25 Gram(s) IV Push once  dextrose 50% Injectable 25 Gram(s) IV Push once  docusate sodium 100 milliGRAM(s) Oral three times a day  gabapentin 400 milliGRAM(s) Oral three times a day  insulin lispro (HumaLOG) corrective regimen sliding scale   SubCutaneous three times a day before meals  lactated ringers. 1000 milliLiter(s) (100 mL/Hr) IV Continuous <Continuous>  methocarbamol 750 milliGRAM(s) Oral four times a day  multivitamin 1 Tablet(s) Oral daily  senna 2 Tablet(s) Oral at bedtime    MEDICATIONS  (PRN):  dextrose 40% Gel 15 Gram(s) Oral once PRN Blood Glucose LESS THAN 70 milliGRAM(s)/deciliter  glucagon  Injectable 1 milliGRAM(s) IntraMuscular once PRN Glucose LESS THAN 70 milligrams/deciliter  HYDROmorphone  Injectable 1 milliGRAM(s) IV Push every 4 hours PRN Severe Pain (7 - 10)  naloxone Injectable 0.1 milliGRAM(s) IV Push every 3 minutes PRN For ANY of the following changes in patient status:  A. RR LESS THAN 10 breaths per minute, B. Oxygen saturation LESS THAN 90%, C. Sedation score of 6  ondansetron Injectable 4 milliGRAM(s) IV Push every 6 hours PRN Nausea  oxyCODONE    IR 5 milliGRAM(s) Oral every 4 hours PRN Mild Pain (1 - 3)  oxyCODONE    IR 10 milliGRAM(s) Oral every 4 hours PRN Moderate Pain (4 - 6)  prochlorperazine   Injectable 10 milliGRAM(s) IV Push every 8 hours PRN Nausea/vomiting    ASSESSMENT AND PLAN:  64m, PMH as above a/w:    1. Lumbar disc herniation with radiculopathy s/p L3-4 decompression/fusion:  -pod#3  -pain control  -incentive spirometer  -physical therapy    2. Acute blood loss anemia:  -due to surgery  -no need for transfusion at this time.    3. Urinary retention:   -lozada placed   -started on flomax  -urology consulted.     4. Atrial fibrillation s/p ablation:  -not on rate controlling agents.  -resume pradaxa when ok with spine.     5. Sleep apnea:  -does not use device.  -using cpap here.    6. DMII:  -hold ohas  -ss    7. DVT px.    transfer to 2N oif ok with ortho

## 2019-06-16 ENCOUNTER — TRANSCRIPTION ENCOUNTER (OUTPATIENT)
Age: 64
End: 2019-06-16

## 2019-06-16 LAB
ANION GAP SERPL CALC-SCNC: 4 MMOL/L — LOW (ref 5–17)
BUN SERPL-MCNC: 11 MG/DL — SIGNIFICANT CHANGE UP (ref 7–23)
CALCIUM SERPL-MCNC: 8.3 MG/DL — LOW (ref 8.5–10.1)
CHLORIDE SERPL-SCNC: 102 MMOL/L — SIGNIFICANT CHANGE UP (ref 96–108)
CO2 SERPL-SCNC: 30 MMOL/L — SIGNIFICANT CHANGE UP (ref 22–31)
CREAT SERPL-MCNC: 0.75 MG/DL — SIGNIFICANT CHANGE UP (ref 0.5–1.3)
GLUCOSE BLDC GLUCOMTR-MCNC: 118 MG/DL — HIGH (ref 70–99)
GLUCOSE BLDC GLUCOMTR-MCNC: 143 MG/DL — HIGH (ref 70–99)
GLUCOSE BLDC GLUCOMTR-MCNC: 207 MG/DL — HIGH (ref 70–99)
GLUCOSE SERPL-MCNC: 152 MG/DL — HIGH (ref 70–99)
HCT VFR BLD CALC: 27.5 % — LOW (ref 39–50)
HGB BLD-MCNC: 8.9 G/DL — LOW (ref 13–17)
MCHC RBC-ENTMCNC: 28.3 PG — SIGNIFICANT CHANGE UP (ref 27–34)
MCHC RBC-ENTMCNC: 32.4 GM/DL — SIGNIFICANT CHANGE UP (ref 32–36)
MCV RBC AUTO: 87.3 FL — SIGNIFICANT CHANGE UP (ref 80–100)
PLATELET # BLD AUTO: 221 K/UL — SIGNIFICANT CHANGE UP (ref 150–400)
POTASSIUM SERPL-MCNC: 3.5 MMOL/L — SIGNIFICANT CHANGE UP (ref 3.5–5.3)
POTASSIUM SERPL-SCNC: 3.5 MMOL/L — SIGNIFICANT CHANGE UP (ref 3.5–5.3)
RBC # BLD: 3.15 M/UL — LOW (ref 4.2–5.8)
RBC # FLD: 14.5 % — SIGNIFICANT CHANGE UP (ref 10.3–14.5)
SODIUM SERPL-SCNC: 136 MMOL/L — SIGNIFICANT CHANGE UP (ref 135–145)
WBC # BLD: 12.49 K/UL — HIGH (ref 3.8–10.5)
WBC # FLD AUTO: 12.49 K/UL — HIGH (ref 3.8–10.5)

## 2019-06-16 RX ADMIN — Medication 2: at 11:40

## 2019-06-16 RX ADMIN — GABAPENTIN 400 MILLIGRAM(S): 400 CAPSULE ORAL at 13:59

## 2019-06-16 RX ADMIN — OXYCODONE HYDROCHLORIDE 10 MILLIGRAM(S): 5 TABLET ORAL at 20:34

## 2019-06-16 RX ADMIN — Medication 975 MILLIGRAM(S): at 17:01

## 2019-06-16 RX ADMIN — OXYCODONE HYDROCHLORIDE 10 MILLIGRAM(S): 5 TABLET ORAL at 09:37

## 2019-06-16 RX ADMIN — Medication 975 MILLIGRAM(S): at 05:18

## 2019-06-16 RX ADMIN — Medication 1000 UNIT(S): at 08:41

## 2019-06-16 RX ADMIN — Medication 1 TABLET(S): at 08:41

## 2019-06-16 RX ADMIN — GABAPENTIN 400 MILLIGRAM(S): 400 CAPSULE ORAL at 22:32

## 2019-06-16 RX ADMIN — Medication 975 MILLIGRAM(S): at 11:41

## 2019-06-16 RX ADMIN — METHOCARBAMOL 750 MILLIGRAM(S): 500 TABLET, FILM COATED ORAL at 05:18

## 2019-06-16 RX ADMIN — METHOCARBAMOL 750 MILLIGRAM(S): 500 TABLET, FILM COATED ORAL at 16:59

## 2019-06-16 RX ADMIN — Medication 975 MILLIGRAM(S): at 16:59

## 2019-06-16 RX ADMIN — TAMSULOSIN HYDROCHLORIDE 0.4 MILLIGRAM(S): 0.4 CAPSULE ORAL at 22:32

## 2019-06-16 RX ADMIN — GABAPENTIN 400 MILLIGRAM(S): 400 CAPSULE ORAL at 05:18

## 2019-06-16 RX ADMIN — OXYCODONE HYDROCHLORIDE 10 MILLIGRAM(S): 5 TABLET ORAL at 19:38

## 2019-06-16 RX ADMIN — OXYCODONE HYDROCHLORIDE 10 MILLIGRAM(S): 5 TABLET ORAL at 10:07

## 2019-06-16 RX ADMIN — Medication 975 MILLIGRAM(S): at 11:38

## 2019-06-16 RX ADMIN — Medication 975 MILLIGRAM(S): at 22:34

## 2019-06-16 RX ADMIN — METHOCARBAMOL 750 MILLIGRAM(S): 500 TABLET, FILM COATED ORAL at 11:38

## 2019-06-16 RX ADMIN — Medication 975 MILLIGRAM(S): at 22:31

## 2019-06-16 RX ADMIN — METHOCARBAMOL 750 MILLIGRAM(S): 500 TABLET, FILM COATED ORAL at 22:32

## 2019-06-16 RX ADMIN — Medication 975 MILLIGRAM(S): at 05:21

## 2019-06-16 NOTE — PROGRESS NOTE ADULT - SUBJECTIVE AND OBJECTIVE BOX
c/c: back pain    HPI: 64M, pmh of lumbar disc disease with radiculopathy, atrial flutter s/p ablation on pradaxa, asthma, sleep apnea (does not use device), DMII, BPH, who is admitted for lumbar decompression/fusion. Hospitalist service consulted for medical comanagement. Hospital course notable for constipation, urinary retention requiring lozada.     6/16: pt seen and examined this am with significant other at bedside. FEels pain is not controlled. No sob/chest pain. Able to ambulate.   No f/c/r. no n/v/d/abd pain.    ROS: all 10 systems reviewed and is as above otherwise negative.     Vital Signs Last 24 Hrs  T(C): 37.3 (16 Jun 2019 05:00), Max: 37.6 (15 Robbie 2019 16:30)  T(F): 99.1 (16 Jun 2019 05:00), Max: 99.6 (15 Robbie 2019 16:30)  HR: 110 (16 Jun 2019 05:00) (102 - 110)  BP: 153/70 (16 Jun 2019 05:00) (118/74 - 153/70)  RR: 18 (16 Jun 2019 05:00) (18 - 18)  SpO2: 97% (16 Jun 2019 05:00) (93% - 98%)    PHYSICAL EXAM:    GENERAL: appears well, nad  HEAD:  Normocephalic, atraumatic  EYES: EOMI, PERRLA  HEENT: moist mucous membranes  NECK: Supple, No JVD   NERVOUS SYSTEM:  non focal.  CHEST/LUNG: Clear to auscultation bilaterally  HEART: Regular rate and rhythm  ABDOMEN: Soft, Nontender, Nondistended, Bowel sounds present  GENITOURINARY: +lozada  EXTREMITIES:   No clubbing, cyanosis, or edema  MUSCULOSKELETAL- back dressing c/d/i  SKIN-no rash    LABS:                        8.9    12.49 )-----------( 221      ( 16 Jun 2019 07:19 )             27.5     06-16    136  |  102  |  11  ----------------------------<  152<H>  3.5   |  30  |  0.75    Ca    8.3<L>      16 Jun 2019 07:19        MEDICATIONS  (STANDING):  acetaminophen   Tablet .. 975 milliGRAM(s) Oral every 6 hours  cholecalciferol 1000 Unit(s) Oral daily  dextrose 5%. 1000 milliLiter(s) (50 mL/Hr) IV Continuous <Continuous>  dextrose 50% Injectable 12.5 Gram(s) IV Push once  dextrose 50% Injectable 25 Gram(s) IV Push once  dextrose 50% Injectable 25 Gram(s) IV Push once  docusate sodium 100 milliGRAM(s) Oral three times a day  gabapentin 400 milliGRAM(s) Oral three times a day  insulin lispro (HumaLOG) corrective regimen sliding scale   SubCutaneous three times a day before meals  lactated ringers. 1000 milliLiter(s) (100 mL/Hr) IV Continuous <Continuous>  methocarbamol 750 milliGRAM(s) Oral four times a day  multivitamin 1 Tablet(s) Oral daily  senna 2 Tablet(s) Oral at bedtime    MEDICATIONS  (PRN):  dextrose 40% Gel 15 Gram(s) Oral once PRN Blood Glucose LESS THAN 70 milliGRAM(s)/deciliter  glucagon  Injectable 1 milliGRAM(s) IntraMuscular once PRN Glucose LESS THAN 70 milligrams/deciliter  HYDROmorphone  Injectable 1 milliGRAM(s) IV Push every 4 hours PRN Severe Pain (7 - 10)  naloxone Injectable 0.1 milliGRAM(s) IV Push every 3 minutes PRN For ANY of the following changes in patient status:  A. RR LESS THAN 10 breaths per minute, B. Oxygen saturation LESS THAN 90%, C. Sedation score of 6  ondansetron Injectable 4 milliGRAM(s) IV Push every 6 hours PRN Nausea  oxyCODONE    IR 5 milliGRAM(s) Oral every 4 hours PRN Mild Pain (1 - 3)  oxyCODONE    IR 10 milliGRAM(s) Oral every 4 hours PRN Moderate Pain (4 - 6)  prochlorperazine   Injectable 10 milliGRAM(s) IV Push every 8 hours PRN Nausea/vomiting    ASSESSMENT AND PLAN:  64m, PMH as above a/w:    1. Lumbar disc herniation with radiculopathy s/p L3-4 decompression/fusion:  -pod#4  -pain control  -incentive spirometer  -physical therapy    2. Acute blood loss anemia:  -due to surgery  -no need for transfusion at this time.    3. Urinary retention:   -lozada placed   -started on flomax  -urology consult appreciated, to dc with lozada and f/u as outpt for tov.    4. Atrial fibrillation s/p ablation:  -not on rate controlling agents.  -no longer requires a/c per d/w patient (took a/c X 1 month post ablation.)    5. Sleep apnea:  -does not use device.  -using cpap here.    6. DMII:  -resume oha upon dc.     7. DVT px.

## 2019-06-16 NOTE — DISCHARGE NOTE NURSING/CASE MANAGEMENT/SOCIAL WORK - NSDCDPATPORTLINK_GEN_ALL_CORE
You can access the [facility] Patient Portal, offered by [facility], by registering with the following website: [email or web address]

## 2019-06-16 NOTE — PROGRESS NOTE ADULT - SUBJECTIVE AND OBJECTIVE BOX
Pt S/E at bedside, pain controlled - patient sleeping and lying in bed comfortably, has been OOB and ambulating with walker.  Nelson placed yesterday due to chronic BPH and retention post op, pt states no issues.     Vital Signs Last 24 Hrs  T(C): 37.3 (16 Jun 2019 05:00), Max: 37.6 (15 Robbie 2019 16:30)  T(F): 99.1 (16 Jun 2019 05:00), Max: 99.6 (15 Robbie 2019 16:30)  HR: 110 (16 Jun 2019 05:00) (102 - 110)  BP: 153/70 (16 Jun 2019 05:00) (118/74 - 153/70)  BP(mean): --  RR: 18 (16 Jun 2019 05:00) (18 - 18)  SpO2: 97% (16 Jun 2019 05:00) (93% - 98%)    Gen: NAD    Spine:  Dressing C/D/I, changed yesterday   +EHL/FHL/TA/GS, 5/5 in all muscle fields  +AIN/PIN/M/R/U/Msc/Ax, 5/5 in all muscle fields  SILT L3-S1  SILT C5-T1  +DP/PT Pulses  +Radial Pulse  Compartments soft  No calf TTP B/L        A/P: 64M s/p L3-S1 PSF POD 4    LSO brace at bedside, wear while ambulating   Analgesia  DVT ppx- SCDs  Nelson management per Urology, Fu as outpatient   WBAT with brace only while ambulating   PT/OT  FU Labs  DC planning- Home today  appreciate medical co management   Dr Horan aware and agrees with plan. Resolution Resolved.  You had difficulty speaking so we admitted you for possible TIA (transient ischemic attack).  All the imaging such ct and mri did not show any evidence of a stroke.  Your symptoms have resolved.  But it is good to control your risk factors such as atrial fibrillation. Please start taking Eliquis 2.5 mg twice a day.  Follow up with Dr. Cantu.  Please stop the plavix.

## 2019-06-17 VITALS
DIASTOLIC BLOOD PRESSURE: 64 MMHG | HEART RATE: 95 BPM | OXYGEN SATURATION: 95 % | SYSTOLIC BLOOD PRESSURE: 128 MMHG | RESPIRATION RATE: 18 BRPM | TEMPERATURE: 98 F

## 2019-06-17 PROBLEM — K57.92 DIVERTICULITIS OF INTESTINE, PART UNSPECIFIED, WITHOUT PERFORATION OR ABSCESS WITHOUT BLEEDING: Chronic | Status: ACTIVE | Noted: 2019-06-03

## 2019-06-17 PROBLEM — M19.90 UNSPECIFIED OSTEOARTHRITIS, UNSPECIFIED SITE: Chronic | Status: ACTIVE | Noted: 2019-06-03

## 2019-06-17 PROBLEM — J45.909 UNSPECIFIED ASTHMA, UNCOMPLICATED: Chronic | Status: ACTIVE | Noted: 2019-06-03

## 2019-06-17 PROBLEM — N40.0 BENIGN PROSTATIC HYPERPLASIA WITHOUT LOWER URINARY TRACT SYMPTOMS: Chronic | Status: ACTIVE | Noted: 2019-06-03

## 2019-06-17 PROBLEM — G47.30 SLEEP APNEA, UNSPECIFIED: Chronic | Status: ACTIVE | Noted: 2019-06-03

## 2019-06-17 PROBLEM — M51.9 UNSPECIFIED THORACIC, THORACOLUMBAR AND LUMBOSACRAL INTERVERTEBRAL DISC DISORDER: Chronic | Status: ACTIVE | Noted: 2019-06-03

## 2019-06-17 PROBLEM — E11.9 TYPE 2 DIABETES MELLITUS WITHOUT COMPLICATIONS: Chronic | Status: ACTIVE | Noted: 2019-06-03

## 2019-06-17 PROBLEM — E66.9 OBESITY, UNSPECIFIED: Chronic | Status: ACTIVE | Noted: 2019-06-03

## 2019-06-17 LAB
GLUCOSE BLDC GLUCOMTR-MCNC: 146 MG/DL — HIGH (ref 70–99)
GLUCOSE BLDC GLUCOMTR-MCNC: 162 MG/DL — HIGH (ref 70–99)
SURGICAL PATHOLOGY STUDY: SIGNIFICANT CHANGE UP

## 2019-06-17 RX ADMIN — GABAPENTIN 400 MILLIGRAM(S): 400 CAPSULE ORAL at 05:39

## 2019-06-17 RX ADMIN — OXYCODONE HYDROCHLORIDE 10 MILLIGRAM(S): 5 TABLET ORAL at 06:23

## 2019-06-17 RX ADMIN — OXYCODONE HYDROCHLORIDE 10 MILLIGRAM(S): 5 TABLET ORAL at 05:36

## 2019-06-17 RX ADMIN — Medication 975 MILLIGRAM(S): at 05:40

## 2019-06-17 RX ADMIN — METHOCARBAMOL 750 MILLIGRAM(S): 500 TABLET, FILM COATED ORAL at 05:40

## 2019-06-17 RX ADMIN — Medication 1: at 08:28

## 2019-06-17 RX ADMIN — Medication 975 MILLIGRAM(S): at 05:42

## 2019-06-17 RX ADMIN — METHOCARBAMOL 750 MILLIGRAM(S): 500 TABLET, FILM COATED ORAL at 12:17

## 2019-06-17 NOTE — CHART NOTE - NSCHARTNOTEFT_GEN_A_CORE
patient seen at bedside. Dressing was changed. Patient requesting having Pain Rx sent to [facility] pharmacy, which will be delivered to his home via fedex. Pt declining local pharmacy for pain Rx.   -Patient ortho stable for DC today to Home.

## 2019-06-17 NOTE — PROVIDER CONTACT NOTE (OTHER) - SITUATION
NO PCP LISTED
Spoke to Evelyn saldaña.
pt. found to have accidentally pulled out hemovac drain to lumbar spine. denies unusual discomfort. no active bleeding

## 2019-06-17 NOTE — PROGRESS NOTE ADULT - SUBJECTIVE AND OBJECTIVE BOX
Pt S/E at bedside, pain controlled - lying in bed comfortably, has been OOB and ambulating with walker.      Vital Signs Last 24 Hrs  T(C): 38 (17 Jun 2019 05:35), Max: 38.2 (16 Jun 2019 18:25)  T(F): 100.4 (17 Jun 2019 05:35), Max: 100.8 (16 Jun 2019 18:25)  HR: 99 (17 Jun 2019 05:35) (98 - 106)  BP: 142/70 (17 Jun 2019 05:35) (120/80 - 142/70)  BP(mean): --  RR: 18 (17 Jun 2019 05:35) (18 - 18)  SpO2: 95% (17 Jun 2019 05:35) (95% - 98%)    Gen: NAD    Spine:  Dressing C/D/I  +EHL/FHL/TA/GS, 5/5 in all muscle fields  +AIN/PIN/M/R/U/Msc/Ax, 5/5 in all muscle fields  SILT L3-S1  SILT C5-T1  +DP/PT Pulses  +Radial Pulse  Compartments soft  No calf TTP B/L        A/P: 64M s/p L3-S1 PSF POD 5    LSO brace at bedside, wear while ambulating   Analgesia  DVT ppx- SCDs  Nelson management per Urology, Fu as outpatient   WBAT with brace only while ambulating   PT/OT  FU Labs  DC planning- Home today  appreciate medical co management   Dr Horan aware and agrees with plan.

## 2019-06-18 ENCOUNTER — APPOINTMENT (OUTPATIENT)
Dept: UROLOGY | Facility: CLINIC | Age: 64
End: 2019-06-18
Payer: COMMERCIAL

## 2019-06-18 VITALS
WEIGHT: 260 LBS | BODY MASS INDEX: 36.4 KG/M2 | HEIGHT: 71 IN | TEMPERATURE: 98.1 F | HEART RATE: 89 BPM | DIASTOLIC BLOOD PRESSURE: 77 MMHG | RESPIRATION RATE: 16 BRPM | OXYGEN SATURATION: 97 % | SYSTOLIC BLOOD PRESSURE: 127 MMHG

## 2019-06-18 DIAGNOSIS — Z86.69 PERSONAL HISTORY OF OTHER DISEASES OF THE NERVOUS SYSTEM AND SENSE ORGANS: ICD-10-CM

## 2019-06-18 DIAGNOSIS — Z86.39 PERSONAL HISTORY OF OTHER ENDOCRINE, NUTRITIONAL AND METABOLIC DISEASE: ICD-10-CM

## 2019-06-18 DIAGNOSIS — Z86.79 PERSONAL HISTORY OF OTHER DISEASES OF THE CIRCULATORY SYSTEM: ICD-10-CM

## 2019-06-18 DIAGNOSIS — Z87.09 PERSONAL HISTORY OF OTHER DISEASES OF THE RESPIRATORY SYSTEM: ICD-10-CM

## 2019-06-18 PROCEDURE — 99213 OFFICE O/P EST LOW 20 MIN: CPT

## 2019-06-18 RX ORDER — METFORMIN HYDROCHLORIDE 625 MG/1
TABLET ORAL
Refills: 0 | Status: ACTIVE | COMMUNITY

## 2019-06-18 RX ORDER — TAMSULOSIN HYDROCHLORIDE 0.4 MG/1
0.4 CAPSULE ORAL
Refills: 0 | Status: ACTIVE | COMMUNITY

## 2019-06-18 RX ORDER — OXYCODONE HYDROCHLORIDE AND ACETAMINOPHEN 10; 325 MG/1; MG/1
10-325 TABLET ORAL
Refills: 0 | Status: ACTIVE | COMMUNITY

## 2019-06-18 RX ORDER — GABAPENTIN 400 MG
400 TABLET ORAL
Refills: 0 | Status: ACTIVE | COMMUNITY

## 2019-06-18 NOTE — PHYSICAL EXAM
[General Appearance - Well Developed] : well developed [General Appearance - Well Nourished] : well nourished [Normal Appearance] : normal appearance [Well Groomed] : well groomed [General Appearance - In No Acute Distress] : no acute distress [Edema] : no peripheral edema [Respiration, Rhythm And Depth] : normal respiratory rhythm and effort [Exaggerated Use Of Accessory Muscles For Inspiration] : no accessory muscle use [Abdomen Soft] : soft [Abdomen Tenderness] : non-tender [Costovertebral Angle Tenderness] : no ~M costovertebral angle tenderness [Urinary Bladder Findings] : the bladder was normal on palpation [No Prostate Nodules] : no prostate nodules [FreeTextEntry1] : lozada draining clear yellow urine [Normal Station and Gait] : the gait and station were normal for the patient's age [] : no rash [No Focal Deficits] : no focal deficits [Oriented To Time, Place, And Person] : oriented to person, place, and time [Affect] : the affect was normal [Mood] : the mood was normal [Not Anxious] : not anxious [No Palpable Adenopathy] : no palpable adenopathy

## 2019-06-18 NOTE — ASSESSMENT
[FreeTextEntry1] : 63 yo M with BPH, post procedure urinary retention. Pt was offered TOV today, but given that it has only been 3-4 days since Velez was most recently placed, suggested waiting until later in the week to attempt TOV. Pt agrees. WIll follow up in a few days.

## 2019-06-18 NOTE — HISTORY OF PRESENT ILLNESS
[FreeTextEntry1] : 63 yo M originally seen at  6/15/19 for urinary retention after lumbar decompression/fusion. Pt had straight cath x2 for inability to void. At third episode, 800 mL bladder scan resulted in lozada placement. About 1000 mL drained. Pt reports baseline frequency, sensation of incomplete emptying, nocturia 5x/night. His PCP had Rx tamsulosin, but he had not started it. Lozada was replaced 6/15/19.\par \par 06/18/2019: Patient presents for follow up after discharge. He is tolerating the lozada well. No hematuria, no urgency, no hesitancy, no straining. No incontinence. No fevers, no chills, no nausea, no vomiting, no flank pain.

## 2019-06-20 ENCOUNTER — APPOINTMENT (OUTPATIENT)
Dept: UROLOGY | Facility: CLINIC | Age: 64
End: 2019-06-20
Payer: COMMERCIAL

## 2019-06-20 DIAGNOSIS — R33.8 OTHER POSTPROCEDURAL COMPLICATIONS AND DISORDERS OF GENITOURINARY SYSTEM: ICD-10-CM

## 2019-06-20 DIAGNOSIS — N99.89 OTHER POSTPROCEDURAL COMPLICATIONS AND DISORDERS OF GENITOURINARY SYSTEM: ICD-10-CM

## 2019-06-20 PROCEDURE — 51700 IRRIGATION OF BLADDER: CPT

## 2019-06-20 PROCEDURE — 51798 US URINE CAPACITY MEASURE: CPT

## 2019-06-21 DIAGNOSIS — M43.16 SPONDYLOLISTHESIS, LUMBAR REGION: ICD-10-CM

## 2019-06-21 DIAGNOSIS — G47.30 SLEEP APNEA, UNSPECIFIED: ICD-10-CM

## 2019-06-21 DIAGNOSIS — R33.9 RETENTION OF URINE, UNSPECIFIED: ICD-10-CM

## 2019-06-21 DIAGNOSIS — M51.16 INTERVERTEBRAL DISC DISORDERS WITH RADICULOPATHY, LUMBAR REGION: ICD-10-CM

## 2019-06-21 DIAGNOSIS — N40.1 BENIGN PROSTATIC HYPERPLASIA WITH LOWER URINARY TRACT SYMPTOMS: ICD-10-CM

## 2019-06-21 DIAGNOSIS — M48.061 SPINAL STENOSIS, LUMBAR REGION WITHOUT NEUROGENIC CLAUDICATION: ICD-10-CM

## 2019-06-21 DIAGNOSIS — M54.5 LOW BACK PAIN: ICD-10-CM

## 2019-06-21 DIAGNOSIS — I48.92 UNSPECIFIED ATRIAL FLUTTER: ICD-10-CM

## 2019-06-21 DIAGNOSIS — I48.91 UNSPECIFIED ATRIAL FIBRILLATION: ICD-10-CM

## 2019-06-21 DIAGNOSIS — J45.909 UNSPECIFIED ASTHMA, UNCOMPLICATED: ICD-10-CM

## 2019-06-21 DIAGNOSIS — E11.9 TYPE 2 DIABETES MELLITUS WITHOUT COMPLICATIONS: ICD-10-CM

## 2019-06-21 DIAGNOSIS — D62 ACUTE POSTHEMORRHAGIC ANEMIA: ICD-10-CM

## 2019-08-01 ENCOUNTER — APPOINTMENT (OUTPATIENT)
Dept: UROLOGY | Facility: CLINIC | Age: 64
End: 2019-08-01
Payer: COMMERCIAL

## 2019-08-01 DIAGNOSIS — N13.8 BENIGN PROSTATIC HYPERPLASIA WITH LOWER URINARY TRACT SYMPMS: ICD-10-CM

## 2019-08-01 DIAGNOSIS — N40.1 BENIGN PROSTATIC HYPERPLASIA WITH LOWER URINARY TRACT SYMPMS: ICD-10-CM

## 2019-08-01 DIAGNOSIS — R35.1 NOCTURIA: ICD-10-CM

## 2019-08-01 PROCEDURE — 51798 US URINE CAPACITY MEASURE: CPT

## 2019-08-01 PROCEDURE — 99213 OFFICE O/P EST LOW 20 MIN: CPT | Mod: 25

## 2019-08-01 NOTE — HISTORY OF PRESENT ILLNESS
[FreeTextEntry1] : 63 yo M originally seen at  6/15/19 for urinary retention after lumbar decompression/fusion. Pt had straight cath x2 for inability to void. At third episode, 800 mL bladder scan resulted in lozada placement. About 1000 mL drained. Pt reports baseline frequency, sensation of incomplete emptying, nocturia 5x/night. His PCP had Rx tamsulosin, but he had not started it. Lozada was replaced 6/15/19.\par \par 06/18/2019: Patient presents for follow up after discharge. He is tolerating the lozada well. No hematuria, no urgency, no hesitancy, no straining. No incontinence. No fevers, no chills, no nausea, no vomiting, no flank pain. \par \par 08/01/2019: Patient presents for follow up. He complains of nocturia 4-5x/night. Voiding well during day, no sensation of incomplete bladder emptying. has daytime frequency, but attributes it to increased fluid intake. Stops fluids at dinner. Does have LINDA and does not wear CPAP. No hematuria, no dysuria, no frequency, no urgency, no hesitancy, no straining. No incontinence. No fevers, no chills, no nausea, no vomiting, no flank pain. PCR 16 mL.

## 2019-08-01 NOTE — PHYSICAL EXAM
[General Appearance - Well Nourished] : well nourished [General Appearance - Well Developed] : well developed [Normal Appearance] : normal appearance [Well Groomed] : well groomed [General Appearance - In No Acute Distress] : no acute distress [Abdomen Tenderness] : non-tender [Abdomen Soft] : soft [Costovertebral Angle Tenderness] : no ~M costovertebral angle tenderness [Urinary Bladder Findings] : the bladder was normal on palpation [Edema] : no peripheral edema [] : no respiratory distress [Respiration, Rhythm And Depth] : normal respiratory rhythm and effort [Exaggerated Use Of Accessory Muscles For Inspiration] : no accessory muscle use [Affect] : the affect was normal [Oriented To Time, Place, And Person] : oriented to person, place, and time [Mood] : the mood was normal [Not Anxious] : not anxious [No Focal Deficits] : no focal deficits [Normal Station and Gait] : the gait and station were normal for the patient's age [No Palpable Adenopathy] : no palpable adenopathy

## 2019-08-01 NOTE — ASSESSMENT
[FreeTextEntry1] : 65 yo M with BPH, post procedure urinary retention. Now emptying well, but complains of nocturia. Remains on tamsulosin 0.4 mg. \par \par 64 year old M  with nocturia. For nocturia, we discussed that nocturia can be the result of nocturnal polyuria, global polyuria, or diminished bladder capacity either globally or nocturnally. The underlying cause of nocturia can be difficult to discern but include conditions that cause lower extremity edema such as CHF, venous insufficiency. Other causes include CHF, Diabetes mellitus, diabetes insipidus, and others. If any of these conditions are present, they should be addressed before urologic treatments are tried. Nocturia can be caused or exacerbated by BPH or OAB. If these are present, they can be treated. OAB and BPH treatments have been shown to only suboptimally treat nocturia, but should be attempted. We discussed that the best initial tool in the work up for nocturia is a voiding diary. Depending on the results of the diary, treatment options include medications for OAB such as anticholinergics or beta agonists, medications for BPH, behavioral modifications such as stopping fluid intake after 6 PM, or desmopressin to reduce nocturnal polyuria. \par \par Pt opts for voiding diary, will return after completion. Pt as recommended to comply with CPAP, and if nocturia is due to LINDA it will not improve without CPAP.

## 2019-09-05 ENCOUNTER — APPOINTMENT (OUTPATIENT)
Dept: UROLOGY | Facility: CLINIC | Age: 64
End: 2019-09-05

## 2019-11-22 NOTE — ASU PREOP CHECKLIST - NSBLOODTRANS_GEN_A_CORE_SIUH
no...

## 2020-10-21 ENCOUNTER — OUTPATIENT (OUTPATIENT)
Dept: OUTPATIENT SERVICES | Facility: HOSPITAL | Age: 65
LOS: 1 days | End: 2020-10-21
Payer: MEDICARE

## 2020-10-21 VITALS
RESPIRATION RATE: 16 BRPM | OXYGEN SATURATION: 98 % | WEIGHT: 263.89 LBS | HEIGHT: 71 IN | DIASTOLIC BLOOD PRESSURE: 79 MMHG | SYSTOLIC BLOOD PRESSURE: 148 MMHG | HEART RATE: 80 BPM | TEMPERATURE: 98 F

## 2020-10-21 DIAGNOSIS — Z98.890 OTHER SPECIFIED POSTPROCEDURAL STATES: Chronic | ICD-10-CM

## 2020-10-21 DIAGNOSIS — Z01.818 ENCOUNTER FOR OTHER PREPROCEDURAL EXAMINATION: ICD-10-CM

## 2020-10-21 DIAGNOSIS — Z98.1 ARTHRODESIS STATUS: Chronic | ICD-10-CM

## 2020-10-21 DIAGNOSIS — Z90.49 ACQUIRED ABSENCE OF OTHER SPECIFIED PARTS OF DIGESTIVE TRACT: Chronic | ICD-10-CM

## 2020-10-21 DIAGNOSIS — M19.012 PRIMARY OSTEOARTHRITIS, LEFT SHOULDER: ICD-10-CM

## 2020-10-21 DIAGNOSIS — Z96.642 PRESENCE OF LEFT ARTIFICIAL HIP JOINT: Chronic | ICD-10-CM

## 2020-10-21 DIAGNOSIS — Z96.641 PRESENCE OF RIGHT ARTIFICIAL HIP JOINT: Chronic | ICD-10-CM

## 2020-10-21 LAB
ALBUMIN SERPL ELPH-MCNC: 3.9 G/DL — SIGNIFICANT CHANGE UP (ref 3.3–5)
ALP SERPL-CCNC: 102 U/L — SIGNIFICANT CHANGE UP (ref 40–120)
ALT FLD-CCNC: 63 U/L — SIGNIFICANT CHANGE UP (ref 12–78)
ANION GAP SERPL CALC-SCNC: 4 MMOL/L — LOW (ref 5–17)
APTT BLD: 30.7 SEC — SIGNIFICANT CHANGE UP (ref 27.5–35.5)
AST SERPL-CCNC: 29 U/L — SIGNIFICANT CHANGE UP (ref 15–37)
BILIRUB SERPL-MCNC: 0.4 MG/DL — SIGNIFICANT CHANGE UP (ref 0.2–1.2)
BUN SERPL-MCNC: 21 MG/DL — SIGNIFICANT CHANGE UP (ref 7–23)
CALCIUM SERPL-MCNC: 9.1 MG/DL — SIGNIFICANT CHANGE UP (ref 8.5–10.1)
CHLORIDE SERPL-SCNC: 107 MMOL/L — SIGNIFICANT CHANGE UP (ref 96–108)
CO2 SERPL-SCNC: 30 MMOL/L — SIGNIFICANT CHANGE UP (ref 22–31)
CREAT SERPL-MCNC: 1 MG/DL — SIGNIFICANT CHANGE UP (ref 0.5–1.3)
GLUCOSE SERPL-MCNC: 149 MG/DL — HIGH (ref 70–99)
HCT VFR BLD CALC: 40.8 % — SIGNIFICANT CHANGE UP (ref 39–50)
HGB BLD-MCNC: 13.3 G/DL — SIGNIFICANT CHANGE UP (ref 13–17)
INR BLD: 1.07 RATIO — SIGNIFICANT CHANGE UP (ref 0.88–1.16)
MCHC RBC-ENTMCNC: 29.4 PG — SIGNIFICANT CHANGE UP (ref 27–34)
MCHC RBC-ENTMCNC: 32.6 GM/DL — SIGNIFICANT CHANGE UP (ref 32–36)
MCV RBC AUTO: 90.3 FL — SIGNIFICANT CHANGE UP (ref 80–100)
NRBC # BLD: 0 /100 WBCS — SIGNIFICANT CHANGE UP (ref 0–0)
PLATELET # BLD AUTO: 207 K/UL — SIGNIFICANT CHANGE UP (ref 150–400)
POTASSIUM SERPL-MCNC: 3.9 MMOL/L — SIGNIFICANT CHANGE UP (ref 3.5–5.3)
POTASSIUM SERPL-SCNC: 3.9 MMOL/L — SIGNIFICANT CHANGE UP (ref 3.5–5.3)
PROT SERPL-MCNC: 7.9 G/DL — SIGNIFICANT CHANGE UP (ref 6–8.3)
PROTHROM AB SERPL-ACNC: 12.5 SEC — SIGNIFICANT CHANGE UP (ref 10.6–13.6)
RBC # BLD: 4.52 M/UL — SIGNIFICANT CHANGE UP (ref 4.2–5.8)
RBC # FLD: 13 % — SIGNIFICANT CHANGE UP (ref 10.3–14.5)
SODIUM SERPL-SCNC: 141 MMOL/L — SIGNIFICANT CHANGE UP (ref 135–145)
WBC # BLD: 8.71 K/UL — SIGNIFICANT CHANGE UP (ref 3.8–10.5)
WBC # FLD AUTO: 8.71 K/UL — SIGNIFICANT CHANGE UP (ref 3.8–10.5)

## 2020-10-21 PROCEDURE — 87640 STAPH A DNA AMP PROBE: CPT

## 2020-10-21 PROCEDURE — 83036 HEMOGLOBIN GLYCOSYLATED A1C: CPT

## 2020-10-21 PROCEDURE — 86901 BLOOD TYPING SEROLOGIC RH(D): CPT

## 2020-10-21 PROCEDURE — 85610 PROTHROMBIN TIME: CPT

## 2020-10-21 PROCEDURE — 87641 MR-STAPH DNA AMP PROBE: CPT

## 2020-10-21 PROCEDURE — 93005 ELECTROCARDIOGRAM TRACING: CPT

## 2020-10-21 PROCEDURE — 71046 X-RAY EXAM CHEST 2 VIEWS: CPT

## 2020-10-21 PROCEDURE — 71046 X-RAY EXAM CHEST 2 VIEWS: CPT | Mod: 26

## 2020-10-21 PROCEDURE — 73030 X-RAY EXAM OF SHOULDER: CPT

## 2020-10-21 PROCEDURE — G0463: CPT

## 2020-10-21 PROCEDURE — 93010 ELECTROCARDIOGRAM REPORT: CPT

## 2020-10-21 PROCEDURE — 86850 RBC ANTIBODY SCREEN: CPT

## 2020-10-21 PROCEDURE — 85027 COMPLETE CBC AUTOMATED: CPT

## 2020-10-21 PROCEDURE — 86900 BLOOD TYPING SEROLOGIC ABO: CPT

## 2020-10-21 PROCEDURE — 85730 THROMBOPLASTIN TIME PARTIAL: CPT

## 2020-10-21 PROCEDURE — 80053 COMPREHEN METABOLIC PANEL: CPT

## 2020-10-21 PROCEDURE — 73030 X-RAY EXAM OF SHOULDER: CPT | Mod: 26,LT

## 2020-10-21 PROCEDURE — 36415 COLL VENOUS BLD VENIPUNCTURE: CPT

## 2020-10-21 RX ORDER — GLUCAGON INJECTION, SOLUTION 0.5 MG/.1ML
1 INJECTION, SOLUTION SUBCUTANEOUS ONCE
Refills: 0 | Status: DISCONTINUED | OUTPATIENT
Start: 2020-10-30 | End: 2020-10-31

## 2020-10-21 RX ORDER — DAPAGLIFLOZIN 10 MG/1
1 TABLET, FILM COATED ORAL
Qty: 0 | Refills: 0 | DISCHARGE

## 2020-10-21 RX ORDER — DEXTROSE 50 % IN WATER 50 %
15 SYRINGE (ML) INTRAVENOUS ONCE
Refills: 0 | Status: DISCONTINUED | OUTPATIENT
Start: 2020-10-30 | End: 2020-10-31

## 2020-10-21 RX ORDER — SODIUM CHLORIDE 9 MG/ML
1000 INJECTION, SOLUTION INTRAVENOUS
Refills: 0 | Status: DISCONTINUED | OUTPATIENT
Start: 2020-10-30 | End: 2020-10-31

## 2020-10-21 RX ORDER — DEXTROSE 50 % IN WATER 50 %
25 SYRINGE (ML) INTRAVENOUS ONCE
Refills: 0 | Status: DISCONTINUED | OUTPATIENT
Start: 2020-10-30 | End: 2020-10-31

## 2020-10-21 RX ORDER — DEXTROSE 50 % IN WATER 50 %
12.5 SYRINGE (ML) INTRAVENOUS ONCE
Refills: 0 | Status: DISCONTINUED | OUTPATIENT
Start: 2020-10-30 | End: 2020-10-31

## 2020-10-21 RX ORDER — GABAPENTIN 400 MG/1
1 CAPSULE ORAL
Qty: 0 | Refills: 0 | DISCHARGE

## 2020-10-21 RX ORDER — METFORMIN HYDROCHLORIDE 850 MG/1
2 TABLET ORAL
Qty: 0 | Refills: 0 | DISCHARGE

## 2020-10-21 NOTE — H&P PST ADULT - NSICDXPASTMEDICALHX_GEN_ALL_CORE_FT
PAST MEDICAL HISTORY:  Arthritis     Asthma h/o    Atrial flutter S/P ablation 2019  post hip replacement    BPH (benign prostatic hyperplasia)     Disc disorder of lumbar region     Diverticulitis     DM (diabetes mellitus)     Headache every other day    History of diverticulitis     OA (osteoarthritis)     Obesity     Primary osteoarthritis, left shoulder     Sleep apnea CPAP

## 2020-10-21 NOTE — H&P PST ADULT - HISTORY OF PRESENT ILLNESS
66 yo male scheduled for Left Total Shoulder Replacement on 10/30/20 with Dr Alejandro.  Patient states he has severe pain when lifting and lying down, decreased range of motion Right Hand Dominat 66 yo male with DM Arthritis Asthma Sleep Apnea  scheduled for Left Total Shoulder Replacement on 10/30/20 with Dr Alejandro.  Patient states he has severe pain when lifting and lying down, and decreased range of motion.   Right Hand Dominant

## 2020-10-21 NOTE — H&P PST ADULT - LAB RESULTS AND INTERPRETATION
ADDENDUM 10/23/2020: Nose Culture obtained in PST on 10/21/2020 = Staph Aureus Detected. RX for Mupirocin Ointment 2 % E- Scribed to patient preferred pharmacy. Pt called and notified of results. Reinforced use of medication with patient who verbalizes understanding. Results also faxed to both Dr Alejandro and patients PCP Dr Philipp Canseco. Kelin MARIN

## 2020-10-21 NOTE — H&P PST ADULT - ASSESSMENT
66 yo male with DM Arthritis Asthma Sleep Apnea  scheduled for Left Total Shoulder Replacement on 10/30/20 with Dr Alejandro

## 2020-10-21 NOTE — H&P PST ADULT - NSICDXPASTSURGICALHX_GEN_ALL_CORE_FT
PAST SURGICAL HISTORY:  H/O prior ablation treatment cardiac ablation  atrial flutter 3/19    H/O spinal fusion multilevel 6/19    History of right hip replacement 2018    S/P cholecystectomy     S/P colon resection 2010    Status post left hip replacement 3/19

## 2020-10-21 NOTE — H&P PST ADULT - NSICDXPROBLEM_GEN_ALL_CORE_FT
PROBLEM DIAGNOSES  Problem: Primary osteoarthritis, left shoulder  Assessment and Plan: check labs cbc cmp hgb a1c type and screen mrsa pt ptt  ekg  medical clearance  cardiology clearance  pulmonary clearance  hibiclens x 3 days with written and verbal instructions given  patient is aware that hgb a1c should be below 9, if greater than 9 patient will need to see an endocriniologist before surgery  fingerstick on admission  hypoglycemeia protocol   patient is aware that he will be scheduled for covid test 72 hours before surgery and should socially isolate after test  no metformin 10/29 and 10/30  10/23 stop Tumeric, Aspirin, Cinnamon, Ibuprofen, Multivitamin and Lutein  preop instructions were given   patient verbalizes understanding of instructions

## 2020-10-22 LAB
A1C WITH ESTIMATED AVERAGE GLUCOSE RESULT: 6.4 % — HIGH (ref 4–5.6)
ESTIMATED AVERAGE GLUCOSE: 137 MG/DL — HIGH (ref 68–114)
MRSA PCR RESULT.: SIGNIFICANT CHANGE UP
S AUREUS DNA NOSE QL NAA+PROBE: DETECTED

## 2020-10-22 NOTE — PATIENT PROFILE ADULT - NSPROSPHOSPCHAPLAINYN_GEN_A_NUR
Thankfully your blood work looks reassuring.  We do not see signs of heart attack.  Chest x-ray shows no signs of new infection or lung collapse.  Do suspect your continuing to heal from your recent infection and hospitalization.  Your sodium was a little low and I encourage you to continue eating a broad diet and drinking lots of fluids.      Plan to follow-up with your primary care provider in 1 week.  If you develop worsening shortness of breath or recurrent fevers we need to see you back in the emergency department.  
no

## 2020-10-23 RX ORDER — MUPIROCIN 20 MG/G
1 OINTMENT TOPICAL
Qty: 1 | Refills: 0
Start: 2020-10-23 | End: 2020-10-27

## 2020-10-27 PROBLEM — I48.92 UNSPECIFIED ATRIAL FLUTTER: Chronic | Status: ACTIVE | Noted: 2019-06-03

## 2020-10-27 PROBLEM — M19.90 UNSPECIFIED OSTEOARTHRITIS, UNSPECIFIED SITE: Chronic | Status: ACTIVE | Noted: 2020-10-21

## 2020-10-27 PROBLEM — R51 HEADACHE: Chronic | Status: ACTIVE | Noted: 2019-06-03

## 2020-10-27 PROBLEM — Z87.19 PERSONAL HISTORY OF OTHER DISEASES OF THE DIGESTIVE SYSTEM: Chronic | Status: ACTIVE | Noted: 2020-10-21

## 2020-10-27 PROBLEM — M19.012 PRIMARY OSTEOARTHRITIS, LEFT SHOULDER: Chronic | Status: ACTIVE | Noted: 2020-10-21

## 2020-10-28 ENCOUNTER — OUTPATIENT (OUTPATIENT)
Dept: OUTPATIENT SERVICES | Facility: HOSPITAL | Age: 65
LOS: 1 days | End: 2020-10-28
Payer: MEDICARE

## 2020-10-28 DIAGNOSIS — Z98.890 OTHER SPECIFIED POSTPROCEDURAL STATES: Chronic | ICD-10-CM

## 2020-10-28 DIAGNOSIS — Z96.641 PRESENCE OF RIGHT ARTIFICIAL HIP JOINT: Chronic | ICD-10-CM

## 2020-10-28 DIAGNOSIS — Z90.49 ACQUIRED ABSENCE OF OTHER SPECIFIED PARTS OF DIGESTIVE TRACT: Chronic | ICD-10-CM

## 2020-10-28 DIAGNOSIS — Z96.642 PRESENCE OF LEFT ARTIFICIAL HIP JOINT: Chronic | ICD-10-CM

## 2020-10-28 DIAGNOSIS — Z11.59 ENCOUNTER FOR SCREENING FOR OTHER VIRAL DISEASES: ICD-10-CM

## 2020-10-28 DIAGNOSIS — Z98.1 ARTHRODESIS STATUS: Chronic | ICD-10-CM

## 2020-10-28 LAB — SARS-COV-2 RNA SPEC QL NAA+PROBE: SIGNIFICANT CHANGE UP

## 2020-10-28 PROCEDURE — U0003: CPT

## 2020-10-29 ENCOUNTER — TRANSCRIPTION ENCOUNTER (OUTPATIENT)
Age: 65
End: 2020-10-29

## 2020-10-29 RX ORDER — BUPIVACAINE 13.3 MG/ML
20 INJECTION, SUSPENSION, LIPOSOMAL INFILTRATION ONCE
Refills: 0 | Status: DISCONTINUED | OUTPATIENT
Start: 2020-10-30 | End: 2020-10-31

## 2020-10-30 ENCOUNTER — INPATIENT (INPATIENT)
Facility: HOSPITAL | Age: 65
LOS: 0 days | Discharge: HOME | DRG: 483 | End: 2020-10-31
Attending: SPECIALIST | Admitting: SPECIALIST
Payer: MEDICARE

## 2020-10-30 ENCOUNTER — RESULT REVIEW (OUTPATIENT)
Age: 65
End: 2020-10-30

## 2020-10-30 VITALS
HEIGHT: 71 IN | RESPIRATION RATE: 15 BRPM | TEMPERATURE: 99 F | OXYGEN SATURATION: 95 % | DIASTOLIC BLOOD PRESSURE: 75 MMHG | WEIGHT: 265 LBS | HEART RATE: 75 BPM | SYSTOLIC BLOOD PRESSURE: 142 MMHG

## 2020-10-30 DIAGNOSIS — Z98.1 ARTHRODESIS STATUS: Chronic | ICD-10-CM

## 2020-10-30 DIAGNOSIS — M19.012 PRIMARY OSTEOARTHRITIS, LEFT SHOULDER: ICD-10-CM

## 2020-10-30 DIAGNOSIS — Z90.49 ACQUIRED ABSENCE OF OTHER SPECIFIED PARTS OF DIGESTIVE TRACT: Chronic | ICD-10-CM

## 2020-10-30 DIAGNOSIS — Z98.890 OTHER SPECIFIED POSTPROCEDURAL STATES: Chronic | ICD-10-CM

## 2020-10-30 DIAGNOSIS — Z96.641 PRESENCE OF RIGHT ARTIFICIAL HIP JOINT: Chronic | ICD-10-CM

## 2020-10-30 DIAGNOSIS — Z96.642 PRESENCE OF LEFT ARTIFICIAL HIP JOINT: Chronic | ICD-10-CM

## 2020-10-30 LAB — ABO RH CONFIRMATION: SIGNIFICANT CHANGE UP

## 2020-10-30 PROCEDURE — 88305 TISSUE EXAM BY PATHOLOGIST: CPT | Mod: 26

## 2020-10-30 PROCEDURE — 73030 X-RAY EXAM OF SHOULDER: CPT | Mod: 26,LT

## 2020-10-30 PROCEDURE — 88311 DECALCIFY TISSUE: CPT | Mod: 26

## 2020-10-30 RX ORDER — CINNAMON BARK 500 MG
0 CAPSULE ORAL
Qty: 0 | Refills: 0 | DISCHARGE

## 2020-10-30 RX ORDER — LUTEIN 20 MG
1 CAPSULE ORAL
Qty: 0 | Refills: 0 | DISCHARGE

## 2020-10-30 RX ORDER — ATORVASTATIN CALCIUM 80 MG/1
20 TABLET, FILM COATED ORAL AT BEDTIME
Refills: 0 | Status: DISCONTINUED | OUTPATIENT
Start: 2020-10-30 | End: 2020-10-31

## 2020-10-30 RX ORDER — SODIUM CHLORIDE 9 MG/ML
1000 INJECTION, SOLUTION INTRAVENOUS
Refills: 0 | Status: DISCONTINUED | OUTPATIENT
Start: 2020-10-30 | End: 2020-10-30

## 2020-10-30 RX ORDER — IBUPROFEN 200 MG
0 TABLET ORAL
Qty: 0 | Refills: 0 | DISCHARGE

## 2020-10-30 RX ORDER — METFORMIN HYDROCHLORIDE 850 MG/1
0 TABLET ORAL
Qty: 0 | Refills: 0 | DISCHARGE

## 2020-10-30 RX ORDER — CEFAZOLIN SODIUM 1 G
2000 VIAL (EA) INJECTION EVERY 8 HOURS
Refills: 0 | Status: COMPLETED | OUTPATIENT
Start: 2020-10-30 | End: 2020-10-30

## 2020-10-30 RX ORDER — ATORVASTATIN CALCIUM 80 MG/1
1 TABLET, FILM COATED ORAL
Qty: 0 | Refills: 0 | DISCHARGE

## 2020-10-30 RX ORDER — INSULIN LISPRO 100/ML
VIAL (ML) SUBCUTANEOUS AT BEDTIME
Refills: 0 | Status: DISCONTINUED | OUTPATIENT
Start: 2020-10-30 | End: 2020-10-31

## 2020-10-30 RX ORDER — OXYCODONE AND ACETAMINOPHEN 5; 325 MG/1; MG/1
1 TABLET ORAL EVERY 4 HOURS
Refills: 0 | Status: DISCONTINUED | OUTPATIENT
Start: 2020-10-30 | End: 2020-10-31

## 2020-10-30 RX ORDER — MULTIVIT-MIN/FERROUS GLUCONATE 9 MG/15 ML
1 LIQUID (ML) ORAL
Qty: 0 | Refills: 0 | DISCHARGE

## 2020-10-30 RX ORDER — OXYCODONE AND ACETAMINOPHEN 5; 325 MG/1; MG/1
2 TABLET ORAL EVERY 6 HOURS
Refills: 0 | Status: DISCONTINUED | OUTPATIENT
Start: 2020-10-30 | End: 2020-10-31

## 2020-10-30 RX ORDER — ASPIRIN/CALCIUM CARB/MAGNESIUM 324 MG
0 TABLET ORAL
Qty: 0 | Refills: 0 | DISCHARGE

## 2020-10-30 RX ORDER — CEFAZOLIN SODIUM 1 G
2000 VIAL (EA) INJECTION ONCE
Refills: 0 | Status: COMPLETED | OUTPATIENT
Start: 2020-10-30 | End: 2020-10-30

## 2020-10-30 RX ORDER — PREGABALIN 225 MG/1
1 CAPSULE ORAL
Qty: 0 | Refills: 0 | DISCHARGE

## 2020-10-30 RX ORDER — ENOXAPARIN SODIUM 100 MG/ML
40 INJECTION SUBCUTANEOUS DAILY
Refills: 0 | Status: DISCONTINUED | OUTPATIENT
Start: 2020-10-31 | End: 2020-10-31

## 2020-10-30 RX ORDER — CHOLECALCIFEROL (VITAMIN D3) 125 MCG
0 CAPSULE ORAL
Qty: 0 | Refills: 0 | DISCHARGE

## 2020-10-30 RX ORDER — ONDANSETRON 8 MG/1
4 TABLET, FILM COATED ORAL ONCE
Refills: 0 | Status: DISCONTINUED | OUTPATIENT
Start: 2020-10-30 | End: 2020-10-30

## 2020-10-30 RX ORDER — INSULIN LISPRO 100/ML
VIAL (ML) SUBCUTANEOUS
Refills: 0 | Status: DISCONTINUED | OUTPATIENT
Start: 2020-10-30 | End: 2020-10-31

## 2020-10-30 RX ORDER — ASPIRIN/CALCIUM CARB/MAGNESIUM 324 MG
81 TABLET ORAL DAILY
Refills: 0 | Status: DISCONTINUED | OUTPATIENT
Start: 2020-10-30 | End: 2020-10-31

## 2020-10-30 RX ORDER — ASCORBIC ACID 60 MG
2 TABLET,CHEWABLE ORAL
Qty: 0 | Refills: 0 | DISCHARGE

## 2020-10-30 RX ORDER — HYDROMORPHONE HYDROCHLORIDE 2 MG/ML
0.5 INJECTION INTRAMUSCULAR; INTRAVENOUS; SUBCUTANEOUS
Refills: 0 | Status: DISCONTINUED | OUTPATIENT
Start: 2020-10-30 | End: 2020-10-30

## 2020-10-30 RX ADMIN — ATORVASTATIN CALCIUM 20 MILLIGRAM(S): 80 TABLET, FILM COATED ORAL at 22:12

## 2020-10-30 RX ADMIN — Medication 100 MILLIGRAM(S): at 17:47

## 2020-10-30 RX ADMIN — SODIUM CHLORIDE 100 MILLILITER(S): 9 INJECTION, SOLUTION INTRAVENOUS at 11:23

## 2020-10-30 RX ADMIN — SODIUM CHLORIDE 60 MILLILITER(S): 9 INJECTION, SOLUTION INTRAVENOUS at 07:24

## 2020-10-30 RX ADMIN — Medication 1: at 17:02

## 2020-10-30 NOTE — PROGRESS NOTE ADULT - ASSESSMENT
A/P:  65y M s/p L Total Shoulder Arthroplasty POD #0  -Pain control prn  - DVT ppx  - NWB in shoulder immobilizer  -No Extension, No abduction, Internal rotation to chest wall, external rotation to neutral.   -PT/OT/OOB as tolerated   - FU am labs  - Med mgmt, continue home meds.  -Will assess again in the morning.

## 2020-10-30 NOTE — PROGRESS NOTE ADULT - SUBJECTIVE AND OBJECTIVE BOX
Orthopedics Post-op Check    POD 0 L Total shoulder arthroplasty  Pt seen and examined at the bedside. Pain is well controlled at this time. Pt reports feeling well, has worked with PT and is progressing well. Pt is aware of plan and agrees, no concerns at this time.     Vital Signs Last 24 Hrs  T(C): 36.5 (10-30-20 @ 14:53), Max: 37.2 (10-30-20 @ 10:39)  T(F): 97.7 (10-30-20 @ 14:53), Max: 99 (10-30-20 @ 10:39)  HR: 94 (10-30-20 @ 14:53) (62 - 94)  BP: 141/71 (10-30-20 @ 14:53) (92/51 - 142/75)  BP(mean): --  RR: 18 (10-30-20 @ 14:53) (12 - 18)  SpO2: 95% (10-30-20 @ 14:53) (95% - 100%)        Exam:  GEN: NAD, awake and alert.  LEFT Upper Extremity:   Dressing dry, clean, intact. SOme axillary ecchymoses  Sensation decreased C5-T1  Motor grossly intact throughout axillary/musculocutaneous/radial/median/ulnar/AIN/PIN nerves. Radial/Axillary motor strength 4/5.  + radial pulse  Compartments soft and compressible

## 2020-10-31 ENCOUNTER — TRANSCRIPTION ENCOUNTER (OUTPATIENT)
Age: 65
End: 2020-10-31

## 2020-10-31 VITALS
TEMPERATURE: 98 F | SYSTOLIC BLOOD PRESSURE: 121 MMHG | DIASTOLIC BLOOD PRESSURE: 55 MMHG | HEART RATE: 85 BPM | OXYGEN SATURATION: 93 % | RESPIRATION RATE: 18 BRPM

## 2020-10-31 LAB
ANION GAP SERPL CALC-SCNC: 7 MMOL/L — SIGNIFICANT CHANGE UP (ref 5–17)
BASOPHILS # BLD AUTO: 0 K/UL — SIGNIFICANT CHANGE UP (ref 0–0.2)
BASOPHILS NFR BLD AUTO: 0 % — SIGNIFICANT CHANGE UP (ref 0–2)
BUN SERPL-MCNC: 22 MG/DL — SIGNIFICANT CHANGE UP (ref 7–23)
CALCIUM SERPL-MCNC: 8.7 MG/DL — SIGNIFICANT CHANGE UP (ref 8.5–10.1)
CHLORIDE SERPL-SCNC: 107 MMOL/L — SIGNIFICANT CHANGE UP (ref 96–108)
CO2 SERPL-SCNC: 27 MMOL/L — SIGNIFICANT CHANGE UP (ref 22–31)
CREAT SERPL-MCNC: 0.95 MG/DL — SIGNIFICANT CHANGE UP (ref 0.5–1.3)
EOSINOPHIL # BLD AUTO: 0.12 K/UL — SIGNIFICANT CHANGE UP (ref 0–0.5)
EOSINOPHIL NFR BLD AUTO: 1 % — SIGNIFICANT CHANGE UP (ref 0–6)
GLUCOSE SERPL-MCNC: 145 MG/DL — HIGH (ref 70–99)
HCT VFR BLD CALC: 34.3 % — LOW (ref 39–50)
HGB BLD-MCNC: 11.6 G/DL — LOW (ref 13–17)
LYMPHOCYTES # BLD AUTO: 2.3 K/UL — SIGNIFICANT CHANGE UP (ref 1–3.3)
LYMPHOCYTES # BLD AUTO: 20 % — SIGNIFICANT CHANGE UP (ref 13–44)
MCHC RBC-ENTMCNC: 30.3 PG — SIGNIFICANT CHANGE UP (ref 27–34)
MCHC RBC-ENTMCNC: 33.8 GM/DL — SIGNIFICANT CHANGE UP (ref 32–36)
MCV RBC AUTO: 89.6 FL — SIGNIFICANT CHANGE UP (ref 80–100)
MONOCYTES # BLD AUTO: 1.15 K/UL — HIGH (ref 0–0.9)
MONOCYTES NFR BLD AUTO: 10 % — SIGNIFICANT CHANGE UP (ref 2–14)
NEUTROPHILS # BLD AUTO: 7.94 K/UL — HIGH (ref 1.8–7.4)
NEUTROPHILS NFR BLD AUTO: 68 % — SIGNIFICANT CHANGE UP (ref 43–77)
NRBC # BLD: SIGNIFICANT CHANGE UP /100 WBCS (ref 0–0)
PLATELET # BLD AUTO: 216 K/UL — SIGNIFICANT CHANGE UP (ref 150–400)
POTASSIUM SERPL-MCNC: 4.3 MMOL/L — SIGNIFICANT CHANGE UP (ref 3.5–5.3)
POTASSIUM SERPL-SCNC: 4.3 MMOL/L — SIGNIFICANT CHANGE UP (ref 3.5–5.3)
RBC # BLD: 3.83 M/UL — LOW (ref 4.2–5.8)
RBC # FLD: 13.3 % — SIGNIFICANT CHANGE UP (ref 10.3–14.5)
SODIUM SERPL-SCNC: 141 MMOL/L — SIGNIFICANT CHANGE UP (ref 135–145)
WBC # BLD: 11.51 K/UL — HIGH (ref 3.8–10.5)
WBC # FLD AUTO: 11.51 K/UL — HIGH (ref 3.8–10.5)

## 2020-10-31 PROCEDURE — 82962 GLUCOSE BLOOD TEST: CPT

## 2020-10-31 PROCEDURE — C1713: CPT

## 2020-10-31 PROCEDURE — 73030 X-RAY EXAM OF SHOULDER: CPT

## 2020-10-31 PROCEDURE — 36415 COLL VENOUS BLD VENIPUNCTURE: CPT

## 2020-10-31 PROCEDURE — 85025 COMPLETE CBC W/AUTO DIFF WBC: CPT

## 2020-10-31 PROCEDURE — 88311 DECALCIFY TISSUE: CPT

## 2020-10-31 PROCEDURE — 80048 BASIC METABOLIC PNL TOTAL CA: CPT

## 2020-10-31 PROCEDURE — 88305 TISSUE EXAM BY PATHOLOGIST: CPT

## 2020-10-31 PROCEDURE — C1776: CPT

## 2020-10-31 RX ADMIN — OXYCODONE AND ACETAMINOPHEN 2 TABLET(S): 5; 325 TABLET ORAL at 07:58

## 2020-10-31 RX ADMIN — OXYCODONE AND ACETAMINOPHEN 2 TABLET(S): 5; 325 TABLET ORAL at 08:58

## 2020-10-31 NOTE — PROGRESS NOTE ADULT - SUBJECTIVE AND OBJECTIVE BOX
POD 1 L Total shoulder arthroplasty  Pt seen and examined at the bedside. Pain is well controlled at this time. Pt reports feeling well, has worked with PT and is progressing well. Pt is aware of plan and agrees, no concerns at this time.     Vital Signs Last 24 Hrs  T(C): 36.6 (31 Oct 2020 05:28), Max: 37.2 (30 Oct 2020 10:39)  T(F): 97.8 (31 Oct 2020 05:28), Max: 99 (30 Oct 2020 10:39)  HR: 85 (31 Oct 2020 05:28) (62 - 103)  BP: 121/55 (31 Oct 2020 05:28) (92/51 - 142/77)  BP(mean): --  RR: 18 (31 Oct 2020 05:28) (12 - 18)  SpO2: 93% (31 Oct 2020 05:28) (91% - 100%)        Exam:  GEN: NAD, awake and alert.  LEFT Upper Extremity:   Dressing dry, clean, intact. Some axillary ecchymoses  SILT C5-T1  Motor grossly intact throughout axillary/musculocutaneous/radial/median/ulnar/AIN/PIN nerves. 5/5 strength  + radial pulse  Compartments soft and compressible

## 2020-10-31 NOTE — DISCHARGE NOTE NURSING/CASE MANAGEMENT/SOCIAL WORK - PATIENT PORTAL LINK FT
You can access the FollowMyHealth Patient Portal offered by [facility] by registering at the following website: [email or web address]. By joining [facility]’s FollowMyHealth portal, you will also be able to view your health information using other applications (apps) compatible with our system.

## 2020-10-31 NOTE — DISCHARGE NOTE PROVIDER - CARE PROVIDER_API CALL
Andrez Alejandro  ORTHOPAEDIC SURGERY  [address] [place] [address]  Phone: [phone]  Fax: [phone]  Follow Up Time:

## 2020-10-31 NOTE — DISCHARGE NOTE PROVIDER - NSDCCPCAREPLAN_GEN_ALL_CORE_FT
PRINCIPAL DISCHARGE DIAGNOSIS  Diagnosis: Primary osteoarthritis, left shoulder  Assessment and Plan of Treatment:

## 2020-10-31 NOTE — DISCHARGE NOTE PROVIDER - HOSPITAL COURSE
H&P:  Pt is a65y Male   PAST MEDICAL & SURGICAL HISTORY:  Primary osteoarthritis, left shoulder    Arthritis    History of diverticulitis    Disc disorder of lumbar region    OA (osteoarthritis)    Diverticulitis    Sleep apnea  CPAP    Asthma  h/o    BPH (benign prostatic hyperplasia)    Atrial flutter  S/P ablation 2019  post hip replacement    Obesity    DM (diabetes mellitus)    Headache  every other day    H/O spinal fusion  multilevel 6/19    H/O prior ablation treatment  cardiac ablation  atrial flutter 3/19    Status post left hip replacement  3/19    History of right hip replacement  2018    S/P cholecystectomy    S/P colon resection  2010         Now s/p Left Total Shoulder Arthroplasty. Pt is afebrile with stable vital signs. Pain is controlled. Alert and Oriented. Exam reveals intact Motor in axillary/radial/median/AIN/PIN/Ulnar distribution.. Dressing is clean and dry with a new bandage on.    Hospital Course:  Patient presented to [facility] medically cleared for elective Left Shoulder Knee Replacement Surgery, having failed outpatient conservative management. Prophylactic antibiotics were started before the procedure and continued for 24 hours. They were admitted after surgery to the orthopedic floor. There were no complications during the hospital stay. All home medications were continued.     Routine consults were obtained from the [facility] for twice daily PT, and from the Hospitalist for Medical Co-management. Patient was placed on Lovenox for anticoagulation. Pertinent home medications were continued. Daily labs were followed.      On POD 0 pt was stable overnight. No major event POD1-2. Pt received twice daily PT and a new dressing was applied prior to discharge. The plan is for DC to home. The orthopedic Attending is aware and agrees.

## 2020-10-31 NOTE — DISCHARGE NOTE PROVIDER - NSDCMRMEDTOKEN_GEN_ALL_CORE_FT
aspirin 81 mg oral tablet:   atorvastatin 20 mg oral tablet: 1 tab(s) orally once a day  Centrum Silver oral tablet: 1 tab(s) orally once a day  Cinnamon 500 mg oral capsule: orally once a day  ibuprofen 200 mg oral tablet:   Lutein 20 mg oral capsule: 1 cap(s) orally once a day  metFORMIN 500 mg oral tablet: orally once a day  oxycodone-acetaminophen 5 mg-325 mg oral tablet: 1 tab(s) orally every 4 hours, As Needed MDD:6 tabs  Vitamin B12 500 mcg oral tablet: 1 tab(s) orally once a day  Vitamin C 500 mg oral tablet: 2 tab(s) orally once a day  Vitamin D3 2000 intl units (50 mcg) oral tablet:

## 2020-10-31 NOTE — DISCHARGE NOTE PROVIDER - NSDCFUADDINST_GEN_ALL_CORE_FT
Total Shoulder Discharge Instructions     1. Activity: No Aggressive ROM until cleared by Dr. Alejandro. Maintain sling at all times EXCEPT to straighten elbow a few times daily. Elevate hand and wiggle fingers.   2. Call with fever over 101, wound redness, drainage.  3. Wound care: Do not remove or change dressing unless saturated.  IF so, apply dry gauze, tegaderm. If staples present, can be removed in at office visit with Dr Alejandro.   4. Continue to apply ICE packs.  5. Follow Up: Orthopedics, Dr. Alejandro 7-10 days in office. Call office to schedule appointment. If going home, eRx sent to your pharmacy for .

## 2020-10-31 NOTE — PROGRESS NOTE ADULT - ASSESSMENT
A/P:  65y M s/p L Total Shoulder Arthroplasty POD #1  -Pain control prn  - DVT ppx  - NWB in shoulder immobilizer  -No Extension, No abduction, Internal rotation to chest wall, external rotation to neutral.   -PT/OT/OOB as tolerated   - FU am labs  - Med mgmt, continue home meds.  -Discharge to home today.

## 2020-11-02 LAB — SURGICAL PATHOLOGY STUDY: SIGNIFICANT CHANGE UP

## 2023-09-28 NOTE — H&P PST ADULT - HARM RISK FACTORS
Detail Level: Detailed Depth Of Biopsy: dermis Was A Bandage Applied: Yes Size Of Lesion In Cm: 0 Anticipated Plan (Based On Presumed Biopsy Results): Mohs Biopsy Type: H and E Biopsy Method: Dermablade Anesthesia Type: 1% lidocaine without epinephrine Anesthesia Volume In Cc (Will Not Render If 0): 0.5 Hemostasis: Drysol Wound Care: Petrolatum Dressing: bandage Destruction After The Procedure: No Type Of Destruction Used: Curettage Curettage Text: The wound bed was treated with curettage after the biopsy was performed. Cryotherapy Text: The wound bed was treated with cryotherapy after the biopsy was performed. Electrodesiccation Text: The wound bed was treated with electrodesiccation after the biopsy was performed. Electrodesiccation And Curettage Text: The wound bed was treated with electrodesiccation and curettage after the biopsy was performed. Silver Nitrate Text: The wound bed was treated with silver nitrate after the biopsy was performed. Lab: 6 Lab Facility: 3 Consent: Written consent was obtained and risks were reviewed including but not limited to scarring, infection, bleeding, scabbing, incomplete removal, nerve damage and allergy to anesthesia. Post-Care Instructions: I reviewed with the patient in detail post-care instructions. Patient is to keep the biopsy site dry overnight, and then apply bacitracin twice daily until healed. Patient may apply hydrogen peroxide soaks to remove any crusting. Notification Instructions: Patient will be notified of biopsy results. However, patient instructed to call the office if not contacted within 2 weeks. Billing Type: Third-Party Bill Information: Selecting Yes will display possible errors in your note based on the variables you have selected. This validation is only offered as a suggestion for you. PLEASE NOTE THAT THE VALIDATION TEXT WILL BE REMOVED WHEN YOU FINALIZE YOUR NOTE. IF YOU WANT TO FAX A PRELIMINARY NOTE YOU WILL NEED TO TOGGLE THIS TO 'NO' IF YOU DO NOT WANT IT IN YOUR FAXED NOTE. no
